# Patient Record
Sex: FEMALE | Race: WHITE
[De-identification: names, ages, dates, MRNs, and addresses within clinical notes are randomized per-mention and may not be internally consistent; named-entity substitution may affect disease eponyms.]

---

## 2017-12-18 ENCOUNTER — TRANSCRIPTION ENCOUNTER (OUTPATIENT)
Age: 62
End: 2017-12-18

## 2017-12-18 PROBLEM — Z00.00 ENCOUNTER FOR PREVENTIVE HEALTH EXAMINATION: Status: ACTIVE | Noted: 2017-12-18

## 2017-12-19 ENCOUNTER — APPOINTMENT (OUTPATIENT)
Dept: NEUROSURGERY | Facility: CLINIC | Age: 62
End: 2017-12-19
Payer: COMMERCIAL

## 2017-12-19 VITALS
BODY MASS INDEX: 33.13 KG/M2 | HEART RATE: 68 BPM | SYSTOLIC BLOOD PRESSURE: 161 MMHG | DIASTOLIC BLOOD PRESSURE: 77 MMHG | HEIGHT: 62 IN | TEMPERATURE: 98.3 F | OXYGEN SATURATION: 94 % | WEIGHT: 180 LBS

## 2017-12-19 DIAGNOSIS — Z86.19 PERSONAL HISTORY OF OTHER INFECTIOUS AND PARASITIC DISEASES: ICD-10-CM

## 2017-12-19 DIAGNOSIS — D33.2 BENIGN NEOPLASM OF BRAIN, UNSPECIFIED: ICD-10-CM

## 2017-12-19 DIAGNOSIS — Z02.82 ENCOUNTER FOR ADOPTION SERVICES: ICD-10-CM

## 2017-12-19 DIAGNOSIS — Z86.39 PERSONAL HISTORY OF OTHER ENDOCRINE, NUTRITIONAL AND METABOLIC DISEASE: ICD-10-CM

## 2017-12-19 DIAGNOSIS — Z94.7 CORNEAL TRANSPLANT STATUS: ICD-10-CM

## 2017-12-19 PROCEDURE — 99244 OFF/OP CNSLTJ NEW/EST MOD 40: CPT | Mod: 57

## 2017-12-19 RX ORDER — CITALOPRAM 20 MG/1
20 TABLET, FILM COATED ORAL
Refills: 0 | Status: ACTIVE | COMMUNITY

## 2017-12-19 RX ORDER — LEVOTHYROXINE SODIUM 0.1 MG/1
100 TABLET ORAL
Refills: 0 | Status: ACTIVE | COMMUNITY

## 2018-01-10 ENCOUNTER — TRANSCRIPTION ENCOUNTER (OUTPATIENT)
Age: 63
End: 2018-01-10

## 2018-02-08 ENCOUNTER — OUTPATIENT (OUTPATIENT)
Dept: OUTPATIENT SERVICES | Facility: HOSPITAL | Age: 63
LOS: 1 days | End: 2018-02-08
Payer: COMMERCIAL

## 2018-02-08 VITALS
DIASTOLIC BLOOD PRESSURE: 73 MMHG | WEIGHT: 184.97 LBS | HEART RATE: 67 BPM | SYSTOLIC BLOOD PRESSURE: 139 MMHG | OXYGEN SATURATION: 96 % | HEIGHT: 62 IN | RESPIRATION RATE: 20 BRPM

## 2018-02-08 DIAGNOSIS — Z01.818 ENCOUNTER FOR OTHER PREPROCEDURAL EXAMINATION: ICD-10-CM

## 2018-02-08 DIAGNOSIS — D33.2 BENIGN NEOPLASM OF BRAIN, UNSPECIFIED: ICD-10-CM

## 2018-02-08 DIAGNOSIS — D32.9 BENIGN NEOPLASM OF MENINGES, UNSPECIFIED: ICD-10-CM

## 2018-02-08 DIAGNOSIS — Z98.890 OTHER SPECIFIED POSTPROCEDURAL STATES: Chronic | ICD-10-CM

## 2018-02-08 DIAGNOSIS — G56.01 CARPAL TUNNEL SYNDROME, RIGHT UPPER LIMB: Chronic | ICD-10-CM

## 2018-02-08 DIAGNOSIS — Z94.7 CORNEAL TRANSPLANT STATUS: Chronic | ICD-10-CM

## 2018-02-08 DIAGNOSIS — Z90.89 ACQUIRED ABSENCE OF OTHER ORGANS: Chronic | ICD-10-CM

## 2018-02-08 LAB
ANION GAP SERPL CALC-SCNC: 10 MMOL/L — SIGNIFICANT CHANGE UP (ref 5–17)
BLD GP AB SCN SERPL QL: NEGATIVE — SIGNIFICANT CHANGE UP
BUN SERPL-MCNC: 11 MG/DL — SIGNIFICANT CHANGE UP (ref 7–23)
CALCIUM SERPL-MCNC: 9.3 MG/DL — SIGNIFICANT CHANGE UP (ref 8.4–10.5)
CHLORIDE SERPL-SCNC: 103 MMOL/L — SIGNIFICANT CHANGE UP (ref 96–108)
CO2 SERPL-SCNC: 27 MMOL/L — SIGNIFICANT CHANGE UP (ref 22–31)
CREAT SERPL-MCNC: 0.61 MG/DL — SIGNIFICANT CHANGE UP (ref 0.5–1.3)
GLUCOSE SERPL-MCNC: 100 MG/DL — HIGH (ref 70–99)
HCT VFR BLD CALC: 40.1 % — SIGNIFICANT CHANGE UP (ref 34.5–45)
HGB BLD-MCNC: 13.3 G/DL — SIGNIFICANT CHANGE UP (ref 11.5–15.5)
MCHC RBC-ENTMCNC: 30.6 PG — SIGNIFICANT CHANGE UP (ref 27–34)
MCHC RBC-ENTMCNC: 33.2 GM/DL — SIGNIFICANT CHANGE UP (ref 32–36)
MCV RBC AUTO: 92.2 FL — SIGNIFICANT CHANGE UP (ref 80–100)
PLATELET # BLD AUTO: 258 K/UL — SIGNIFICANT CHANGE UP (ref 150–400)
POTASSIUM SERPL-MCNC: 3.9 MMOL/L — SIGNIFICANT CHANGE UP (ref 3.5–5.3)
POTASSIUM SERPL-SCNC: 3.9 MMOL/L — SIGNIFICANT CHANGE UP (ref 3.5–5.3)
RBC # BLD: 4.35 M/UL — SIGNIFICANT CHANGE UP (ref 3.8–5.2)
RBC # FLD: 12.6 % — SIGNIFICANT CHANGE UP (ref 10.3–14.5)
RH IG SCN BLD-IMP: NEGATIVE — SIGNIFICANT CHANGE UP
SODIUM SERPL-SCNC: 140 MMOL/L — SIGNIFICANT CHANGE UP (ref 135–145)
WBC # BLD: 6.11 K/UL — SIGNIFICANT CHANGE UP (ref 3.8–10.5)
WBC # FLD AUTO: 6.11 K/UL — SIGNIFICANT CHANGE UP (ref 3.8–10.5)

## 2018-02-08 PROCEDURE — 86850 RBC ANTIBODY SCREEN: CPT

## 2018-02-08 PROCEDURE — 86900 BLOOD TYPING SEROLOGIC ABO: CPT

## 2018-02-08 PROCEDURE — 93005 ELECTROCARDIOGRAM TRACING: CPT

## 2018-02-08 PROCEDURE — 85027 COMPLETE CBC AUTOMATED: CPT

## 2018-02-08 PROCEDURE — 93010 ELECTROCARDIOGRAM REPORT: CPT

## 2018-02-08 PROCEDURE — 86901 BLOOD TYPING SEROLOGIC RH(D): CPT

## 2018-02-08 PROCEDURE — G0463: CPT

## 2018-02-08 PROCEDURE — 80048 BASIC METABOLIC PNL TOTAL CA: CPT

## 2018-02-08 RX ORDER — SODIUM CHLORIDE 9 MG/ML
3 INJECTION INTRAMUSCULAR; INTRAVENOUS; SUBCUTANEOUS EVERY 8 HOURS
Qty: 0 | Refills: 0 | Status: DISCONTINUED | OUTPATIENT
Start: 2018-02-15 | End: 2018-02-15

## 2018-02-08 RX ORDER — LIDOCAINE HCL 20 MG/ML
0.2 VIAL (ML) INJECTION ONCE
Qty: 0 | Refills: 0 | Status: DISCONTINUED | OUTPATIENT
Start: 2018-02-15 | End: 2018-02-15

## 2018-02-08 NOTE — H&P PST ADULT - HISTORY OF PRESENT ILLNESS
63 y/o  female with PMHx of Hypothyroidism, obesity, depression, now presents with headaches for many years which was getting worse lately, diagnostic  imaging reveals a meningioma, seen & evaluated by neurosurgeon & now recommends a Right Frontal Craniotomy for tumor with sterotaxy on 2/15/18.

## 2018-02-08 NOTE — H&P PST ADULT - NSANTHOSAYNRD_GEN_A_CORE
No. CUCO screening performed.  STOP BANG Legend: 0-2 = LOW Risk; 3-4 = INTERMEDIATE Risk; 5-8 = HIGH Risk

## 2018-02-08 NOTE — H&P PST ADULT - PSH
Carpal tunnel syndrome of right wrist     delivery delivered    Corneal transplant status  left  H/O melanoma excision  in right Lower ext  S/P T&A (status post tonsillectomy and adenoidectomy)

## 2018-02-08 NOTE — H&P PST ADULT - PMH
Carpal tunnel syndrome    Depression    Melanoma  Right LE  Meningioma    Migraine  HA Carpal tunnel syndrome    Depression    Hypothyroidism    Melanoma  Right LE  Meniere disease    Meningioma    Migraine  HA  Obesity

## 2018-02-08 NOTE — H&P PST ADULT - ATTENDING COMMENTS
Patient with right frontal meningioma. I spoke with her by phone and nothing has changed over the past few weeks. She wishes to proceed with surgery for tumor removal. I have discussed the risks and benefits of surgery with her at length. My office note has more details.

## 2018-02-12 ENCOUNTER — OUTPATIENT (OUTPATIENT)
Dept: OUTPATIENT SERVICES | Facility: HOSPITAL | Age: 63
LOS: 1 days | End: 2018-02-12
Payer: COMMERCIAL

## 2018-02-12 ENCOUNTER — APPOINTMENT (OUTPATIENT)
Dept: MRI IMAGING | Facility: CLINIC | Age: 63
End: 2018-02-12
Payer: COMMERCIAL

## 2018-02-12 ENCOUNTER — TRANSCRIPTION ENCOUNTER (OUTPATIENT)
Age: 63
End: 2018-02-12

## 2018-02-12 DIAGNOSIS — G56.01 CARPAL TUNNEL SYNDROME, RIGHT UPPER LIMB: Chronic | ICD-10-CM

## 2018-02-12 DIAGNOSIS — Z00.8 ENCOUNTER FOR OTHER GENERAL EXAMINATION: ICD-10-CM

## 2018-02-12 DIAGNOSIS — Z90.89 ACQUIRED ABSENCE OF OTHER ORGANS: Chronic | ICD-10-CM

## 2018-02-12 DIAGNOSIS — Z94.7 CORNEAL TRANSPLANT STATUS: Chronic | ICD-10-CM

## 2018-02-12 DIAGNOSIS — Z98.890 OTHER SPECIFIED POSTPROCEDURAL STATES: Chronic | ICD-10-CM

## 2018-02-12 PROCEDURE — 70553 MRI BRAIN STEM W/O & W/DYE: CPT | Mod: 26

## 2018-02-12 PROCEDURE — 70553 MRI BRAIN STEM W/O & W/DYE: CPT

## 2018-02-12 PROCEDURE — A9585: CPT

## 2018-02-12 PROCEDURE — 82565 ASSAY OF CREATININE: CPT

## 2018-02-15 ENCOUNTER — TRANSCRIPTION ENCOUNTER (OUTPATIENT)
Age: 63
End: 2018-02-15

## 2018-02-15 ENCOUNTER — INPATIENT (INPATIENT)
Facility: HOSPITAL | Age: 63
LOS: 3 days | Discharge: HOME | DRG: 27 | End: 2018-02-19
Attending: NEUROLOGICAL SURGERY | Admitting: NEUROLOGICAL SURGERY
Payer: COMMERCIAL

## 2018-02-15 ENCOUNTER — APPOINTMENT (OUTPATIENT)
Dept: NEUROSURGERY | Facility: CLINIC | Age: 63
End: 2018-02-15

## 2018-02-15 ENCOUNTER — RESULT REVIEW (OUTPATIENT)
Age: 63
End: 2018-02-15

## 2018-02-15 VITALS
RESPIRATION RATE: 18 BRPM | WEIGHT: 184.97 LBS | DIASTOLIC BLOOD PRESSURE: 79 MMHG | TEMPERATURE: 98 F | OXYGEN SATURATION: 98 % | HEART RATE: 79 BPM | HEIGHT: 62 IN | SYSTOLIC BLOOD PRESSURE: 149 MMHG

## 2018-02-15 DIAGNOSIS — D33.2 BENIGN NEOPLASM OF BRAIN, UNSPECIFIED: ICD-10-CM

## 2018-02-15 DIAGNOSIS — G56.01 CARPAL TUNNEL SYNDROME, RIGHT UPPER LIMB: Chronic | ICD-10-CM

## 2018-02-15 DIAGNOSIS — Z90.89 ACQUIRED ABSENCE OF OTHER ORGANS: Chronic | ICD-10-CM

## 2018-02-15 DIAGNOSIS — D32.9 BENIGN NEOPLASM OF MENINGES, UNSPECIFIED: ICD-10-CM

## 2018-02-15 DIAGNOSIS — Z98.890 OTHER SPECIFIED POSTPROCEDURAL STATES: Chronic | ICD-10-CM

## 2018-02-15 DIAGNOSIS — Z94.7 CORNEAL TRANSPLANT STATUS: Chronic | ICD-10-CM

## 2018-02-15 PROCEDURE — 88307 TISSUE EXAM BY PATHOLOGIST: CPT | Mod: 26

## 2018-02-15 PROCEDURE — 61781 SCAN PROC CRANIAL INTRA: CPT

## 2018-02-15 PROCEDURE — 99233 SBSQ HOSP IP/OBS HIGH 50: CPT

## 2018-02-15 PROCEDURE — 88342 IMHCHEM/IMCYTCHM 1ST ANTB: CPT | Mod: 26,59

## 2018-02-15 PROCEDURE — 61512 CRNEC TREPH EXC MNGIOMA STTL: CPT

## 2018-02-15 PROCEDURE — 88360 TUMOR IMMUNOHISTOCHEM/MANUAL: CPT | Mod: 26

## 2018-02-15 RX ORDER — OXYCODONE AND ACETAMINOPHEN 5; 325 MG/1; MG/1
1 TABLET ORAL EVERY 4 HOURS
Qty: 0 | Refills: 0 | Status: DISCONTINUED | OUTPATIENT
Start: 2018-02-15 | End: 2018-02-16

## 2018-02-15 RX ORDER — HYDROMORPHONE HYDROCHLORIDE 2 MG/ML
0.5 INJECTION INTRAMUSCULAR; INTRAVENOUS; SUBCUTANEOUS EVERY 6 HOURS
Qty: 0 | Refills: 0 | Status: DISCONTINUED | OUTPATIENT
Start: 2018-02-15 | End: 2018-02-17

## 2018-02-15 RX ORDER — VANCOMYCIN HCL 1 G
1000 VIAL (EA) INTRAVENOUS ONCE
Qty: 0 | Refills: 0 | Status: COMPLETED | OUTPATIENT
Start: 2018-02-15 | End: 2018-02-15

## 2018-02-15 RX ORDER — LEVETIRACETAM 250 MG/1
500 TABLET, FILM COATED ORAL EVERY 12 HOURS
Qty: 0 | Refills: 0 | Status: DISCONTINUED | OUTPATIENT
Start: 2018-02-15 | End: 2018-02-19

## 2018-02-15 RX ORDER — ACETAMINOPHEN 500 MG
650 TABLET ORAL EVERY 6 HOURS
Qty: 0 | Refills: 0 | Status: DISCONTINUED | OUTPATIENT
Start: 2018-02-15 | End: 2018-02-17

## 2018-02-15 RX ORDER — DOCUSATE SODIUM 100 MG
100 CAPSULE ORAL
Qty: 0 | Refills: 0 | Status: DISCONTINUED | OUTPATIENT
Start: 2018-02-15 | End: 2018-02-19

## 2018-02-15 RX ORDER — DEXTROSE MONOHYDRATE, SODIUM CHLORIDE, AND POTASSIUM CHLORIDE 50; .745; 4.5 G/1000ML; G/1000ML; G/1000ML
1000 INJECTION, SOLUTION INTRAVENOUS
Qty: 0 | Refills: 0 | Status: DISCONTINUED | OUTPATIENT
Start: 2018-02-15 | End: 2018-02-16

## 2018-02-15 RX ORDER — ONDANSETRON 8 MG/1
4 TABLET, FILM COATED ORAL ONCE
Qty: 0 | Refills: 0 | Status: COMPLETED | OUTPATIENT
Start: 2018-02-15 | End: 2018-02-15

## 2018-02-15 RX ORDER — SENNA PLUS 8.6 MG/1
1 TABLET ORAL DAILY
Qty: 0 | Refills: 0 | Status: DISCONTINUED | OUTPATIENT
Start: 2018-02-15 | End: 2018-02-19

## 2018-02-15 RX ORDER — HYDROMORPHONE HYDROCHLORIDE 2 MG/ML
0.5 INJECTION INTRAMUSCULAR; INTRAVENOUS; SUBCUTANEOUS
Qty: 0 | Refills: 0 | Status: DISCONTINUED | OUTPATIENT
Start: 2018-02-15 | End: 2018-02-15

## 2018-02-15 RX ORDER — ONDANSETRON 8 MG/1
8 TABLET, FILM COATED ORAL ONCE
Qty: 0 | Refills: 0 | Status: COMPLETED | OUTPATIENT
Start: 2018-02-15 | End: 2018-02-15

## 2018-02-15 RX ORDER — LEVOTHYROXINE SODIUM 125 MCG
100 TABLET ORAL DAILY
Qty: 0 | Refills: 0 | Status: DISCONTINUED | OUTPATIENT
Start: 2018-02-15 | End: 2018-02-19

## 2018-02-15 RX ORDER — ACETAMINOPHEN 500 MG
1000 TABLET ORAL ONCE
Qty: 0 | Refills: 0 | Status: COMPLETED | OUTPATIENT
Start: 2018-02-15 | End: 2018-02-15

## 2018-02-15 RX ORDER — OXYCODONE AND ACETAMINOPHEN 5; 325 MG/1; MG/1
2 TABLET ORAL EVERY 4 HOURS
Qty: 0 | Refills: 0 | Status: DISCONTINUED | OUTPATIENT
Start: 2018-02-15 | End: 2018-02-16

## 2018-02-15 RX ORDER — CITALOPRAM 10 MG/1
20 TABLET, FILM COATED ORAL DAILY
Qty: 0 | Refills: 0 | Status: DISCONTINUED | OUTPATIENT
Start: 2018-02-15 | End: 2018-02-19

## 2018-02-15 RX ADMIN — DEXTROSE MONOHYDRATE, SODIUM CHLORIDE, AND POTASSIUM CHLORIDE 75 MILLILITER(S): 50; .745; 4.5 INJECTION, SOLUTION INTRAVENOUS at 23:20

## 2018-02-15 RX ADMIN — Medication 1000 MILLIGRAM(S): at 22:30

## 2018-02-15 RX ADMIN — HYDROMORPHONE HYDROCHLORIDE 0.5 MILLIGRAM(S): 2 INJECTION INTRAMUSCULAR; INTRAVENOUS; SUBCUTANEOUS at 18:13

## 2018-02-15 RX ADMIN — HYDROMORPHONE HYDROCHLORIDE 0.5 MILLIGRAM(S): 2 INJECTION INTRAMUSCULAR; INTRAVENOUS; SUBCUTANEOUS at 18:34

## 2018-02-15 RX ADMIN — DEXTROSE MONOHYDRATE, SODIUM CHLORIDE, AND POTASSIUM CHLORIDE 75 MILLILITER(S): 50; .745; 4.5 INJECTION, SOLUTION INTRAVENOUS at 14:55

## 2018-02-15 RX ADMIN — HYDROMORPHONE HYDROCHLORIDE 0.5 MILLIGRAM(S): 2 INJECTION INTRAMUSCULAR; INTRAVENOUS; SUBCUTANEOUS at 13:54

## 2018-02-15 RX ADMIN — HYDROMORPHONE HYDROCHLORIDE 0.5 MILLIGRAM(S): 2 INJECTION INTRAMUSCULAR; INTRAVENOUS; SUBCUTANEOUS at 12:30

## 2018-02-15 RX ADMIN — LEVETIRACETAM 500 MILLIGRAM(S): 250 TABLET, FILM COATED ORAL at 19:56

## 2018-02-15 RX ADMIN — ONDANSETRON 4 MILLIGRAM(S): 8 TABLET, FILM COATED ORAL at 17:25

## 2018-02-15 RX ADMIN — HYDROMORPHONE HYDROCHLORIDE 0.5 MILLIGRAM(S): 2 INJECTION INTRAMUSCULAR; INTRAVENOUS; SUBCUTANEOUS at 14:08

## 2018-02-15 RX ADMIN — HYDROMORPHONE HYDROCHLORIDE 0.5 MILLIGRAM(S): 2 INJECTION INTRAMUSCULAR; INTRAVENOUS; SUBCUTANEOUS at 15:00

## 2018-02-15 RX ADMIN — Medication 400 MILLIGRAM(S): at 22:12

## 2018-02-15 RX ADMIN — HYDROMORPHONE HYDROCHLORIDE 0.5 MILLIGRAM(S): 2 INJECTION INTRAMUSCULAR; INTRAVENOUS; SUBCUTANEOUS at 12:16

## 2018-02-15 RX ADMIN — HYDROMORPHONE HYDROCHLORIDE 0.5 MILLIGRAM(S): 2 INJECTION INTRAMUSCULAR; INTRAVENOUS; SUBCUTANEOUS at 23:35

## 2018-02-15 RX ADMIN — HYDROMORPHONE HYDROCHLORIDE 0.5 MILLIGRAM(S): 2 INJECTION INTRAMUSCULAR; INTRAVENOUS; SUBCUTANEOUS at 23:50

## 2018-02-15 RX ADMIN — HYDROMORPHONE HYDROCHLORIDE 0.5 MILLIGRAM(S): 2 INJECTION INTRAMUSCULAR; INTRAVENOUS; SUBCUTANEOUS at 13:33

## 2018-02-15 RX ADMIN — ONDANSETRON 8 MILLIGRAM(S): 8 TABLET, FILM COATED ORAL at 18:35

## 2018-02-15 NOTE — PROGRESS NOTE ADULT - ASSESSMENT
ASSESSMENT/PLAN:  now s/p Right Frontal Craniotomy for tumor with on 2/15/18,  POD 0      NEURO: neuro checks q1hrs, Keppra for seizure ppx, Pain control, CT head in the AM   Activity: [] mobilize as tolerated [] Bedrest [] PT [] OT [] PMNR    PULM:  O2 sat > 92%      CV:  SBP goal  MAP > 65    RENAL:  Fluids:  NS @ 75    GI:  Diet: advance diet as tolerated   GI prophylaxis [x] not indicated [] PPI [] other:  Bowel regimen [] colace [] senna [] other:    ENDO:   Goal euglycemia (-180)  Hypothyroid c/w Levothyroxine    HEME/ONC:  VTE prophylaxis: [] SCDs [] chemoprophylaxis [x] hold chemoprophylaxis due to:  fresh post op patient  [x] high risk of DVT/PE on admission due to:  Meingioma    ID:  afebrile    MISC:    SOCIAL/FAMILY:  [x] awaiting [] updated at bedside [] family meeting    CODE STATUS:  [x] Full Code [] DNR [] DNI [] Palliative/Comfort Care    DISPOSITION:  [x] ICU [] Stroke Unit [] Floor [] EMU [] RCU [] PCU    [x] Patient is at high risk of neurologic deterioration/death due to:   resection of meningioma with risk of intracranial hemorrhage       Time seen:  Time spent: ___ [] critical care minutes    Contact: [phone] ASSESSMENT/PLAN:  now s/p Right Frontal Craniotomy for tumor with on 2/15/18,  POD 0      NEURO: neuro checks q1hrs, Keppra for seizure ppx, Pain control, CT head in the AM   Activity: [x] mobilize as tolerated [] Bedrest [] PT [] OT [] PMNR    PULM:  O2 sat > 92%      CV:  SBP goal  MAP > 65    RENAL:  Fluids:  NS @ 75    GI:  Diet: advance diet as tolerated   GI prophylaxis [x] not indicated [] PPI [] other:  Bowel regimen [] colace [] senna [] other:    ENDO:   Goal euglycemia (-180)  Hypothyroid c/w Levothyroxine    HEME/ONC:  VTE prophylaxis: [] SCDs [] chemoprophylaxis [x] hold chemoprophylaxis due to:  fresh post op patient  [x] high risk of DVT/PE on admission due to:  Meingioma    ID:  afebrile    MISC:    SOCIAL/FAMILY:  [x] awaiting [] updated at bedside [] family meeting    CODE STATUS:  [x] Full Code [] DNR [] DNI [] Palliative/Comfort Care    DISPOSITION:  [x] ICU [] Stroke Unit [] Floor [] EMU [] RCU [] PCU    [x] Patient is at high risk of neurologic deterioration/death due to:   resection of meningioma with risk of intracranial hemorrhage       high complexity     Contact: [phone]

## 2018-02-15 NOTE — PROGRESS NOTE ADULT - SUBJECTIVE AND OBJECTIVE BOX
61 y/o  female with PMHx of Hypothyroidism, obesity, depression, now presents with headaches for many years which was getting worse lately, diagnostic  imaging reveals a meningioma, seen & evaluated by neurosurgeon   now s/p Right Frontal Craniotomy for tumor with on 2/15/18    Patient seen and examined at bedside       VITALS:  T(C): , Max: 36.6 (02-15-18 @ 05:46)  HR:  (54 - 79)  BP:  (121/58 - 177/79)  ABP:  (129/56 - 162/72)  RR:  (16 - 18)  SpO2:  (94% - 98%)  Wt(kg): --      02-15-18 @ 07:01  -  02-15-18 @ 17:59  --------------------------------------------------------  IN: 450 mL / OUT: 465 mL / NET: -15 mL      LABS:  Pending   IMAGING:   Recent imaging studies were reviewed.    MEDICATIONS:  acetaminophen   Tablet 650 milliGRAM(s) Oral every 6 hours PRN  acetaminophen   Tablet. 650 milliGRAM(s) Oral every 6 hours PRN  acetaminophen  IVPB. 1000 milliGRAM(s) IV Intermittent once PRN  citalopram 20 milliGRAM(s) Oral daily  HYDROmorphone  Injectable 0.5 milliGRAM(s) IV Push every 10 minutes PRN  HYDROmorphone  Injectable 0.5 milliGRAM(s) IV Push every 6 hours PRN  levETIRAcetam 500 milliGRAM(s) Oral every 12 hours  levothyroxine 100 MICROGram(s) Oral daily  oxyCODONE    5 mG/acetaminophen 325 mG 1 Tablet(s) Oral every 4 hours PRN  oxyCODONE    5 mG/acetaminophen 325 mG 2 Tablet(s) Oral every 4 hours PRN  sodium chloride 0.9% with potassium chloride 20 mEq/L 1000 milliLiter(s) IV Continuous <Continuous>      EXAMINATION:  General:  calm  HEENT:  MMM, dressing clean dry intact, Hemovac   Neuro:  awake, alert, oriented x 3, follows commands, moves all extremities  Cards:  RRR  Respiratory:  no respiratory distress  Adomen:  soft  : Traore   Extremities:  no edema  Skin:  warm/dry 63 y/o  female with PMHx of Hypothyroidism, obesity, depression, now presents with headaches for many years which was getting worse lately, diagnostic  imaging reveals a meningioma, seen & evaluated by neurosurgeon   now s/p Right Frontal Craniotomy for tumor with on 2/15/18    Patient seen and examined at bedside     Events: craniotomy for meningeoma resection       VITALS:  T(C): , Max: 36.6 (02-15-18 @ 05:46)  HR:  (54 - 79)  BP:  (121/58 - 177/79)  ABP:  (129/56 - 162/72)  RR:  (16 - 18)  SpO2:  (94% - 98%)  Wt(kg): --      02-15-18 @ 07:01  -  02-15-18 @ 17:59  --------------------------------------------------------  IN: 450 mL / OUT: 465 mL / NET: -15 mL      LABS:  Pending   IMAGING:   Recent imaging studies were reviewed.    MEDICATIONS:  acetaminophen   Tablet 650 milliGRAM(s) Oral every 6 hours PRN  acetaminophen   Tablet. 650 milliGRAM(s) Oral every 6 hours PRN  acetaminophen  IVPB. 1000 milliGRAM(s) IV Intermittent once PRN  citalopram 20 milliGRAM(s) Oral daily  HYDROmorphone  Injectable 0.5 milliGRAM(s) IV Push every 10 minutes PRN  HYDROmorphone  Injectable 0.5 milliGRAM(s) IV Push every 6 hours PRN  levETIRAcetam 500 milliGRAM(s) Oral every 12 hours  levothyroxine 100 MICROGram(s) Oral daily  oxyCODONE    5 mG/acetaminophen 325 mG 1 Tablet(s) Oral every 4 hours PRN  oxyCODONE    5 mG/acetaminophen 325 mG 2 Tablet(s) Oral every 4 hours PRN  sodium chloride 0.9% with potassium chloride 20 mEq/L 1000 milliLiter(s) IV Continuous <Continuous>      EXAMINATION:  General:  calm  HEENT:  MMM, dressing clean dry intact, Hemovac   Neuro:  awake, alert, oriented x 3, follows commands, moves all extremities  Cards:  RRR  Respiratory:  no respiratory distress  Adomen:  soft  : Traore   Extremities:  no edema  Skin:  warm/dry    `

## 2018-02-15 NOTE — BRIEF OPERATIVE NOTE - PROCEDURE
<<-----Click on this checkbox to enter Procedure Brain tumor excision  02/15/2018    Active  [id number]

## 2018-02-16 LAB
ANION GAP SERPL CALC-SCNC: 10 MMOL/L — SIGNIFICANT CHANGE UP (ref 5–17)
BUN SERPL-MCNC: 10 MG/DL — SIGNIFICANT CHANGE UP (ref 7–23)
CALCIUM SERPL-MCNC: 8.7 MG/DL — SIGNIFICANT CHANGE UP (ref 8.4–10.5)
CHLORIDE SERPL-SCNC: 106 MMOL/L — SIGNIFICANT CHANGE UP (ref 96–108)
CO2 SERPL-SCNC: 25 MMOL/L — SIGNIFICANT CHANGE UP (ref 22–31)
CREAT SERPL-MCNC: 0.56 MG/DL — SIGNIFICANT CHANGE UP (ref 0.5–1.3)
GLUCOSE SERPL-MCNC: 166 MG/DL — HIGH (ref 70–99)
HCT VFR BLD CALC: 36.2 % — SIGNIFICANT CHANGE UP (ref 34.5–45)
HGB BLD-MCNC: 12.5 G/DL — SIGNIFICANT CHANGE UP (ref 11.5–15.5)
MAGNESIUM SERPL-MCNC: 2 MG/DL — SIGNIFICANT CHANGE UP (ref 1.6–2.6)
MCHC RBC-ENTMCNC: 32.5 PG — SIGNIFICANT CHANGE UP (ref 27–34)
MCHC RBC-ENTMCNC: 34.4 GM/DL — SIGNIFICANT CHANGE UP (ref 32–36)
MCV RBC AUTO: 94.4 FL — SIGNIFICANT CHANGE UP (ref 80–100)
PHOSPHATE SERPL-MCNC: 3.2 MG/DL — SIGNIFICANT CHANGE UP (ref 2.5–4.5)
PLATELET # BLD AUTO: 252 K/UL — SIGNIFICANT CHANGE UP (ref 150–400)
POTASSIUM SERPL-MCNC: 4.1 MMOL/L — SIGNIFICANT CHANGE UP (ref 3.5–5.3)
POTASSIUM SERPL-SCNC: 4.1 MMOL/L — SIGNIFICANT CHANGE UP (ref 3.5–5.3)
RBC # BLD: 3.84 M/UL — SIGNIFICANT CHANGE UP (ref 3.8–5.2)
RBC # FLD: 11.4 % — SIGNIFICANT CHANGE UP (ref 10.3–14.5)
SODIUM SERPL-SCNC: 141 MMOL/L — SIGNIFICANT CHANGE UP (ref 135–145)
WBC # BLD: 9.4 K/UL — SIGNIFICANT CHANGE UP (ref 3.8–10.5)
WBC # FLD AUTO: 9.4 K/UL — SIGNIFICANT CHANGE UP (ref 3.8–10.5)

## 2018-02-16 PROCEDURE — 70450 CT HEAD/BRAIN W/O DYE: CPT | Mod: 26

## 2018-02-16 PROCEDURE — 99232 SBSQ HOSP IP/OBS MODERATE 35: CPT

## 2018-02-16 RX ORDER — OXYCODONE HYDROCHLORIDE 5 MG/1
5 TABLET ORAL EVERY 6 HOURS
Qty: 0 | Refills: 0 | Status: DISCONTINUED | OUTPATIENT
Start: 2018-02-16 | End: 2018-02-17

## 2018-02-16 RX ORDER — ONDANSETRON 8 MG/1
4 TABLET, FILM COATED ORAL EVERY 6 HOURS
Qty: 0 | Refills: 0 | Status: DISCONTINUED | OUTPATIENT
Start: 2018-02-16 | End: 2018-02-19

## 2018-02-16 RX ORDER — ONDANSETRON 8 MG/1
4 TABLET, FILM COATED ORAL ONCE
Qty: 0 | Refills: 0 | Status: COMPLETED | OUTPATIENT
Start: 2018-02-16 | End: 2018-02-16

## 2018-02-16 RX ORDER — ENOXAPARIN SODIUM 100 MG/ML
40 INJECTION SUBCUTANEOUS AT BEDTIME
Qty: 0 | Refills: 0 | Status: DISCONTINUED | OUTPATIENT
Start: 2018-02-16 | End: 2018-02-19

## 2018-02-16 RX ORDER — HYDROMORPHONE HYDROCHLORIDE 2 MG/ML
0.5 INJECTION INTRAMUSCULAR; INTRAVENOUS; SUBCUTANEOUS ONCE
Qty: 0 | Refills: 0 | Status: DISCONTINUED | OUTPATIENT
Start: 2018-02-16 | End: 2018-02-16

## 2018-02-16 RX ORDER — ACETAMINOPHEN 500 MG
1000 TABLET ORAL ONCE
Qty: 0 | Refills: 0 | Status: COMPLETED | OUTPATIENT
Start: 2018-02-16 | End: 2018-02-16

## 2018-02-16 RX ORDER — OXYCODONE HYDROCHLORIDE 5 MG/1
10 TABLET ORAL EVERY 6 HOURS
Qty: 0 | Refills: 0 | Status: DISCONTINUED | OUTPATIENT
Start: 2018-02-16 | End: 2018-02-17

## 2018-02-16 RX ORDER — ONDANSETRON 8 MG/1
8 TABLET, FILM COATED ORAL ONCE
Qty: 0 | Refills: 0 | Status: COMPLETED | OUTPATIENT
Start: 2018-02-16 | End: 2018-02-16

## 2018-02-16 RX ADMIN — Medication 650 MILLIGRAM(S): at 12:38

## 2018-02-16 RX ADMIN — ONDANSETRON 4 MILLIGRAM(S): 8 TABLET, FILM COATED ORAL at 06:41

## 2018-02-16 RX ADMIN — ONDANSETRON 8 MILLIGRAM(S): 8 TABLET, FILM COATED ORAL at 13:40

## 2018-02-16 RX ADMIN — ENOXAPARIN SODIUM 40 MILLIGRAM(S): 100 INJECTION SUBCUTANEOUS at 21:40

## 2018-02-16 RX ADMIN — Medication 1000 MILLIGRAM(S): at 04:30

## 2018-02-16 RX ADMIN — Medication 100 MICROGRAM(S): at 07:01

## 2018-02-16 RX ADMIN — OXYCODONE HYDROCHLORIDE 10 MILLIGRAM(S): 5 TABLET ORAL at 18:07

## 2018-02-16 RX ADMIN — Medication 100 MILLIGRAM(S): at 18:07

## 2018-02-16 RX ADMIN — HYDROMORPHONE HYDROCHLORIDE 0.5 MILLIGRAM(S): 2 INJECTION INTRAMUSCULAR; INTRAVENOUS; SUBCUTANEOUS at 06:40

## 2018-02-16 RX ADMIN — SENNA PLUS 1 TABLET(S): 8.6 TABLET ORAL at 12:10

## 2018-02-16 RX ADMIN — HYDROMORPHONE HYDROCHLORIDE 0.5 MILLIGRAM(S): 2 INJECTION INTRAMUSCULAR; INTRAVENOUS; SUBCUTANEOUS at 12:08

## 2018-02-16 RX ADMIN — Medication 650 MILLIGRAM(S): at 13:40

## 2018-02-16 RX ADMIN — Medication 650 MILLIGRAM(S): at 22:00

## 2018-02-16 RX ADMIN — HYDROMORPHONE HYDROCHLORIDE 0.5 MILLIGRAM(S): 2 INJECTION INTRAMUSCULAR; INTRAVENOUS; SUBCUTANEOUS at 19:40

## 2018-02-16 RX ADMIN — LEVETIRACETAM 500 MILLIGRAM(S): 250 TABLET, FILM COATED ORAL at 07:01

## 2018-02-16 RX ADMIN — HYDROMORPHONE HYDROCHLORIDE 0.5 MILLIGRAM(S): 2 INJECTION INTRAMUSCULAR; INTRAVENOUS; SUBCUTANEOUS at 16:22

## 2018-02-16 RX ADMIN — LEVETIRACETAM 500 MILLIGRAM(S): 250 TABLET, FILM COATED ORAL at 18:07

## 2018-02-16 RX ADMIN — HYDROMORPHONE HYDROCHLORIDE 0.5 MILLIGRAM(S): 2 INJECTION INTRAMUSCULAR; INTRAVENOUS; SUBCUTANEOUS at 06:55

## 2018-02-16 RX ADMIN — Medication 400 MILLIGRAM(S): at 04:09

## 2018-02-16 RX ADMIN — HYDROMORPHONE HYDROCHLORIDE 0.5 MILLIGRAM(S): 2 INJECTION INTRAMUSCULAR; INTRAVENOUS; SUBCUTANEOUS at 16:46

## 2018-02-16 RX ADMIN — HYDROMORPHONE HYDROCHLORIDE 0.5 MILLIGRAM(S): 2 INJECTION INTRAMUSCULAR; INTRAVENOUS; SUBCUTANEOUS at 12:20

## 2018-02-16 RX ADMIN — CITALOPRAM 20 MILLIGRAM(S): 10 TABLET, FILM COATED ORAL at 12:09

## 2018-02-16 RX ADMIN — ONDANSETRON 4 MILLIGRAM(S): 8 TABLET, FILM COATED ORAL at 04:08

## 2018-02-16 RX ADMIN — DEXTROSE MONOHYDRATE, SODIUM CHLORIDE, AND POTASSIUM CHLORIDE 75 MILLILITER(S): 50; .745; 4.5 INJECTION, SOLUTION INTRAVENOUS at 08:00

## 2018-02-16 RX ADMIN — OXYCODONE HYDROCHLORIDE 10 MILLIGRAM(S): 5 TABLET ORAL at 02:23

## 2018-02-16 RX ADMIN — OXYCODONE HYDROCHLORIDE 10 MILLIGRAM(S): 5 TABLET ORAL at 03:00

## 2018-02-16 RX ADMIN — ONDANSETRON 4 MILLIGRAM(S): 8 TABLET, FILM COATED ORAL at 18:07

## 2018-02-16 RX ADMIN — DEXTROSE MONOHYDRATE, SODIUM CHLORIDE, AND POTASSIUM CHLORIDE 75 MILLILITER(S): 50; .745; 4.5 INJECTION, SOLUTION INTRAVENOUS at 02:24

## 2018-02-16 NOTE — PROGRESS NOTE ADULT - SUBJECTIVE AND OBJECTIVE BOX
63 y/o  female with PMHx of Hypothyroidism, obesity, depression, now presents with headaches for many years which was getting worse lately, diagnostic  imaging reveals a meningioma, seen & evaluated by neurosurgeon   now s/p Right Frontal Craniotomy for tumor with on 2/15/18    Patient seen and examined at bedside     Events: craniotomy for meningeoma resection   2/16: No acute events overnight      VITALS:  T(C): , Max: 37.1 (02-16-18 @ 12:00)  HR:  (54 - 104)  BP:  (98/52 - 154/74)  ABP:  (106/52 - 155/75)  RR:  (14 - 19)  SpO2:  (94% - 99%)  Wt(kg): --      02-15-18 @ 07:01  -  02-16-18 @ 07:00  --------------------------------------------------------  IN: 1645 mL / OUT: 2305 mL / NET: -660 mL    02-16-18 @ 07:01  -  02-16-18 @ 12:56  --------------------------------------------------------  IN: 575 mL / OUT: 75 mL / NET: 500 mL      LABS:  Na: 141 (02-15 @ 23:34)  K: 4.1 (02-15 @ 23:34)  Cl: 106 (02-15 @ 23:34)  CO2: 25 (02-15 @ 23:34)  BUN: 10 (02-15 @ 23:34)  Cr: 0.56 (02-15 @ 23:34)  Glu: 166(02-15 @ 23:34)    Hgb: 12.5 (02-15 @ 23:34)  Hct: 36.2 (02-15 @ 23:34)  WBC: 9.4 (02-15 @ 23:34)  Plt: 252 (02-15 @ 23:34)    INR:   PTT:     IMAGING:   Recent imaging studies were reviewed.    MEDICATIONS:  acetaminophen   Tablet 650 milliGRAM(s) Oral every 6 hours PRN  acetaminophen   Tablet. 650 milliGRAM(s) Oral every 6 hours PRN  citalopram 20 milliGRAM(s) Oral daily  docusate sodium 100 milliGRAM(s) Oral two times a day  HYDROmorphone  Injectable 0.5 milliGRAM(s) IV Push every 6 hours PRN  levETIRAcetam 500 milliGRAM(s) Oral every 12 hours  levothyroxine 100 MICROGram(s) Oral daily  oxyCODONE    IR 5 milliGRAM(s) Oral every 6 hours PRN  oxyCODONE    IR 10 milliGRAM(s) Oral every 6 hours PRN  senna 1 Tablet(s) Oral daily  sodium chloride 0.9% with potassium chloride 20 mEq/L 1000 milliLiter(s) IV Continuous <Continuous>      EXAMINATION:  General:  calm  HEENT:  MMM, dressing clean dry intact, Hemovac   Neuro:  awake, alert, oriented x 3, follows commands, moves all extremities  Cards:  RRR  Respiratory:  no respiratory distress  Adomen:  soft  : Traore   Extremities:  no edema  Skin:  warm/dry    ` 61 y/o  female with PMHx of Hypothyroidism, obesity, depression, now presents with headaches for many years which was getting worse lately, diagnostic  imaging reveals a meningioma, seen & evaluated by neurosurgeon   now s/p Right Frontal Craniotomy for tumor with on 2/15/18    Patient seen and examined at bedside     Events: craniotomy for meningeoma resection   2/16: No acute events overnight, vomited in the morning       VITALS:  T(C): , Max: 37.1 (02-16-18 @ 12:00)  HR:  (54 - 104)  BP:  (98/52 - 154/74)  ABP:  (106/52 - 155/75)  RR:  (14 - 19)  SpO2:  (94% - 99%)  Wt(kg): --      02-15-18 @ 07:01  -  02-16-18 @ 07:00  --------------------------------------------------------  IN: 1645 mL / OUT: 2305 mL / NET: -660 mL    02-16-18 @ 07:01  -  02-16-18 @ 12:56  --------------------------------------------------------  IN: 575 mL / OUT: 75 mL / NET: 500 mL      LABS:  Na: 141 (02-15 @ 23:34)  K: 4.1 (02-15 @ 23:34)  Cl: 106 (02-15 @ 23:34)  CO2: 25 (02-15 @ 23:34)  BUN: 10 (02-15 @ 23:34)  Cr: 0.56 (02-15 @ 23:34)  Glu: 166(02-15 @ 23:34)    Hgb: 12.5 (02-15 @ 23:34)  Hct: 36.2 (02-15 @ 23:34)  WBC: 9.4 (02-15 @ 23:34)  Plt: 252 (02-15 @ 23:34)    INR:   PTT:     IMAGING:   Recent imaging studies were reviewed.    MEDICATIONS:  acetaminophen   Tablet 650 milliGRAM(s) Oral every 6 hours PRN  acetaminophen   Tablet. 650 milliGRAM(s) Oral every 6 hours PRN  citalopram 20 milliGRAM(s) Oral daily  docusate sodium 100 milliGRAM(s) Oral two times a day  HYDROmorphone  Injectable 0.5 milliGRAM(s) IV Push every 6 hours PRN  levETIRAcetam 500 milliGRAM(s) Oral every 12 hours  levothyroxine 100 MICROGram(s) Oral daily  oxyCODONE    IR 5 milliGRAM(s) Oral every 6 hours PRN  oxyCODONE    IR 10 milliGRAM(s) Oral every 6 hours PRN  senna 1 Tablet(s) Oral daily  sodium chloride 0.9% with potassium chloride 20 mEq/L 1000 milliLiter(s) IV Continuous <Continuous>      EXAMINATION:  General:  calm  HEENT:  MMM, dressing clean dry intact, Hemovac   Neuro:  awake, alert, oriented x 3, follows commands, moves all extremities  Cards:  RRR  Respiratory:  no respiratory distress  Adomen:  soft  : Traore   Extremities:  no edema  Skin:  warm/dry    `

## 2018-02-16 NOTE — PROGRESS NOTE ADULT - SUBJECTIVE AND OBJECTIVE BOX
NEUROCRITICAL CARE ATTENDING EVENING NOTE    BRIEF SUMMARY:  62yFemale presented with Patient is a 62y old  Female who presents with a chief complaint of I am having a meningioma removed (15 Feb 2018 05:43)      DAY EVENTS:    VITALS/IMAGING/DATA/IVF FLUIDS/MEDICATIONS: [] Reviewed  T(C): 36.6 (02-16-18 @ 04:00), Max: 36.6 (02-15-18 @ 18:30)  HR: 56 (02-16-18 @ 05:00) (54 - 104)  BP: 124/59 (02-16-18 @ 00:00) (121/58 - 177/79)  RR: 14 (02-16-18 @ 05:00) (14 - 19)  SpO2: 98% (02-16-18 @ 05:00) (94% - 99%)    Vent Data:       ALLERGIES: Allergies    penicillin (Rash)  sulfa drugs (Rash)    Intolerances       Is and Os:    02-15-18 @ 07:01  -  02-16-18 @ 06:05  --------------------------------------------------------  IN: 1435 mL / OUT: 2030 mL / NET: -595 mL        Medications:  acetaminophen   Tablet 650 milliGRAM(s) Oral every 6 hours PRN  acetaminophen   Tablet. 650 milliGRAM(s) Oral every 6 hours PRN  citalopram 20 milliGRAM(s) Oral daily  docusate sodium 100 milliGRAM(s) Oral two times a day  HYDROmorphone  Injectable 0.5 milliGRAM(s) IV Push every 6 hours PRN  levETIRAcetam 500 milliGRAM(s) Oral every 12 hours  levothyroxine 100 MICROGram(s) Oral daily  oxyCODONE    IR 5 milliGRAM(s) Oral every 6 hours PRN  oxyCODONE    IR 10 milliGRAM(s) Oral every 6 hours PRN  senna 1 Tablet(s) Oral daily  sodium chloride 0.9% with potassium chloride 20 mEq/L 1000 milliLiter(s) IV Continuous <Continuous>    LABS:    02-15    141  |  106  |  10  ----------------------------<  166<H>  4.1   |  25  |  0.56    Ca    8.7      15 Feb 2018 23:34  Phos  3.2     02-15  Mg     2.0     02-15                            12.5   9.4   )-----------( 252      ( 15 Feb 2018 23:34 )             36.2         EXAMINATION:  General:  calm  HEENT:  MMM, dressing clean dry intact, Hemovac   Neuro:  awake, alert, oriented x 3, follows commands, moves all extremities  Cards:  RRR  Respiratory:  no respiratory distress  Adomen:  soft  : Traore   Extremities:  no edema  Skin:  warm/dry    ASSESSMENT/PLAN:  now s/p Right Frontal Craniotomy for tumor with on 2/15/18,  POD 0      NEURO: neuro checks q1hrs, Keppra for seizure ppx, Pain control, CT head in the AM   Activity: [x] mobilize as tolerated [] Bedrest [] PT [] OT [] PMNR

## 2018-02-16 NOTE — PROGRESS NOTE ADULT - ASSESSMENT
ASSESSMENT/PLAN:  now s/p Right Frontal Craniotomy for tumor with on 2/15/18,  POD 0      NEURO: neuro checks q4hrs, Keppra for seizure ppx, Pain control, CT head this AM looks stable    Activity: [x] mobilize as tolerated [] Bedrest [] PT [] OT [] PMNR    PULM:  O2 sat > 92%      CV:  SBP goal  MAP > 65    RENAL:  Fluids:  IVL     GI:  Diet: advance diet as tolerated   GI prophylaxis [x] not indicated [] PPI [] other:  Bowel regimen [] colace [] senna [] other:    ENDO:   Goal euglycemia (-180)  Hypothyroid c/w Levothyroxine    HEME/ONC:  VTE prophylaxis: [] SCDs [] chemoprophylaxis [x] hold chemoprophylaxis due to:  fresh post op patient  [x] high risk of DVT/PE on admission due to:  Meingioma    ID:  afebrile    SOCIAL/FAMILY:  [x] awaiting [] updated at bedside [] family meeting    CODE STATUS:  [x] Full Code [] DNR [] DNI [] Palliative/Comfort Care    DISPOSITION:  [] ICU [] Stroke Unit [x] Floor [] EMU [] RCU [] PCU    [x] Patient is at high risk of neurologic deterioration/death due to:   resection of meningioma with risk of intracranial hemorrhage       high complexity     Contact: [phone]

## 2018-02-17 LAB
ANION GAP SERPL CALC-SCNC: 12 MMOL/L — SIGNIFICANT CHANGE UP (ref 5–17)
BUN SERPL-MCNC: 11 MG/DL — SIGNIFICANT CHANGE UP (ref 7–23)
CALCIUM SERPL-MCNC: 8.8 MG/DL — SIGNIFICANT CHANGE UP (ref 8.4–10.5)
CHLORIDE SERPL-SCNC: 104 MMOL/L — SIGNIFICANT CHANGE UP (ref 96–108)
CO2 SERPL-SCNC: 26 MMOL/L — SIGNIFICANT CHANGE UP (ref 22–31)
CREAT SERPL-MCNC: 0.62 MG/DL — SIGNIFICANT CHANGE UP (ref 0.5–1.3)
GLUCOSE SERPL-MCNC: 90 MG/DL — SIGNIFICANT CHANGE UP (ref 70–99)
HCT VFR BLD CALC: 36.6 % — SIGNIFICANT CHANGE UP (ref 34.5–45)
HGB BLD-MCNC: 12.6 G/DL — SIGNIFICANT CHANGE UP (ref 11.5–15.5)
MCHC RBC-ENTMCNC: 32.8 PG — SIGNIFICANT CHANGE UP (ref 27–34)
MCHC RBC-ENTMCNC: 34.4 GM/DL — SIGNIFICANT CHANGE UP (ref 32–36)
MCV RBC AUTO: 95.3 FL — SIGNIFICANT CHANGE UP (ref 80–100)
PLATELET # BLD AUTO: 237 K/UL — SIGNIFICANT CHANGE UP (ref 150–400)
POTASSIUM SERPL-MCNC: 4 MMOL/L — SIGNIFICANT CHANGE UP (ref 3.5–5.3)
POTASSIUM SERPL-SCNC: 4 MMOL/L — SIGNIFICANT CHANGE UP (ref 3.5–5.3)
RBC # BLD: 3.85 M/UL — SIGNIFICANT CHANGE UP (ref 3.8–5.2)
RBC # FLD: 11.5 % — SIGNIFICANT CHANGE UP (ref 10.3–14.5)
SODIUM SERPL-SCNC: 142 MMOL/L — SIGNIFICANT CHANGE UP (ref 135–145)
WBC # BLD: 10.1 K/UL — SIGNIFICANT CHANGE UP (ref 3.8–10.5)
WBC # FLD AUTO: 10.1 K/UL — SIGNIFICANT CHANGE UP (ref 3.8–10.5)

## 2018-02-17 RX ORDER — FAMOTIDINE 10 MG/ML
20 INJECTION INTRAVENOUS DAILY
Qty: 0 | Refills: 0 | Status: DISCONTINUED | OUTPATIENT
Start: 2018-02-17 | End: 2018-02-19

## 2018-02-17 RX ORDER — HYDROMORPHONE HYDROCHLORIDE 2 MG/ML
2 INJECTION INTRAMUSCULAR; INTRAVENOUS; SUBCUTANEOUS EVERY 4 HOURS
Qty: 0 | Refills: 0 | Status: DISCONTINUED | OUTPATIENT
Start: 2018-02-17 | End: 2018-02-18

## 2018-02-17 RX ORDER — FAMOTIDINE 10 MG/ML
20 INJECTION INTRAVENOUS DAILY
Qty: 0 | Refills: 0 | Status: DISCONTINUED | OUTPATIENT
Start: 2018-02-17 | End: 2018-02-17

## 2018-02-17 RX ORDER — SODIUM CHLORIDE 9 MG/ML
1000 INJECTION INTRAMUSCULAR; INTRAVENOUS; SUBCUTANEOUS ONCE
Qty: 0 | Refills: 0 | Status: COMPLETED | OUTPATIENT
Start: 2018-02-17 | End: 2018-02-17

## 2018-02-17 RX ORDER — HYDROMORPHONE HYDROCHLORIDE 2 MG/ML
0.5 INJECTION INTRAMUSCULAR; INTRAVENOUS; SUBCUTANEOUS EVERY 4 HOURS
Qty: 0 | Refills: 0 | Status: DISCONTINUED | OUTPATIENT
Start: 2018-02-17 | End: 2018-02-19

## 2018-02-17 RX ADMIN — LEVETIRACETAM 500 MILLIGRAM(S): 250 TABLET, FILM COATED ORAL at 05:51

## 2018-02-17 RX ADMIN — HYDROMORPHONE HYDROCHLORIDE 2 MILLIGRAM(S): 2 INJECTION INTRAMUSCULAR; INTRAVENOUS; SUBCUTANEOUS at 21:48

## 2018-02-17 RX ADMIN — Medication 1 TABLET(S): at 19:27

## 2018-02-17 RX ADMIN — Medication 1 TABLET(S): at 10:31

## 2018-02-17 RX ADMIN — Medication 1 TABLET(S): at 17:41

## 2018-02-17 RX ADMIN — Medication 1 TABLET(S): at 15:08

## 2018-02-17 RX ADMIN — Medication 1 TABLET(S): at 11:00

## 2018-02-17 RX ADMIN — Medication 1 TABLET(S): at 10:59

## 2018-02-17 RX ADMIN — OXYCODONE HYDROCHLORIDE 10 MILLIGRAM(S): 5 TABLET ORAL at 07:27

## 2018-02-17 RX ADMIN — HYDROMORPHONE HYDROCHLORIDE 0.5 MILLIGRAM(S): 2 INJECTION INTRAMUSCULAR; INTRAVENOUS; SUBCUTANEOUS at 17:43

## 2018-02-17 RX ADMIN — OXYCODONE HYDROCHLORIDE 10 MILLIGRAM(S): 5 TABLET ORAL at 06:54

## 2018-02-17 RX ADMIN — ENOXAPARIN SODIUM 40 MILLIGRAM(S): 100 INJECTION SUBCUTANEOUS at 21:06

## 2018-02-17 RX ADMIN — HYDROMORPHONE HYDROCHLORIDE 0.5 MILLIGRAM(S): 2 INJECTION INTRAMUSCULAR; INTRAVENOUS; SUBCUTANEOUS at 07:27

## 2018-02-17 RX ADMIN — CITALOPRAM 20 MILLIGRAM(S): 10 TABLET, FILM COATED ORAL at 11:18

## 2018-02-17 RX ADMIN — HYDROMORPHONE HYDROCHLORIDE 0.5 MILLIGRAM(S): 2 INJECTION INTRAMUSCULAR; INTRAVENOUS; SUBCUTANEOUS at 23:51

## 2018-02-17 RX ADMIN — LEVETIRACETAM 500 MILLIGRAM(S): 250 TABLET, FILM COATED ORAL at 17:40

## 2018-02-17 RX ADMIN — FAMOTIDINE 20 MILLIGRAM(S): 10 INJECTION INTRAVENOUS at 17:40

## 2018-02-17 RX ADMIN — Medication 100 MILLIGRAM(S): at 17:41

## 2018-02-17 RX ADMIN — ONDANSETRON 4 MILLIGRAM(S): 8 TABLET, FILM COATED ORAL at 06:54

## 2018-02-17 RX ADMIN — Medication 100 MILLIGRAM(S): at 05:51

## 2018-02-17 RX ADMIN — SODIUM CHLORIDE 1000 MILLILITER(S): 9 INJECTION INTRAMUSCULAR; INTRAVENOUS; SUBCUTANEOUS at 09:24

## 2018-02-17 RX ADMIN — ONDANSETRON 4 MILLIGRAM(S): 8 TABLET, FILM COATED ORAL at 00:21

## 2018-02-17 RX ADMIN — HYDROMORPHONE HYDROCHLORIDE 2 MILLIGRAM(S): 2 INJECTION INTRAMUSCULAR; INTRAVENOUS; SUBCUTANEOUS at 21:18

## 2018-02-17 RX ADMIN — Medication 1 TABLET(S): at 19:57

## 2018-02-17 RX ADMIN — HYDROMORPHONE HYDROCHLORIDE 0.5 MILLIGRAM(S): 2 INJECTION INTRAMUSCULAR; INTRAVENOUS; SUBCUTANEOUS at 17:50

## 2018-02-17 RX ADMIN — HYDROMORPHONE HYDROCHLORIDE 0.5 MILLIGRAM(S): 2 INJECTION INTRAMUSCULAR; INTRAVENOUS; SUBCUTANEOUS at 05:49

## 2018-02-17 RX ADMIN — Medication 1 TABLET(S): at 10:32

## 2018-02-17 RX ADMIN — HYDROMORPHONE HYDROCHLORIDE 0.5 MILLIGRAM(S): 2 INJECTION INTRAMUSCULAR; INTRAVENOUS; SUBCUTANEOUS at 09:45

## 2018-02-17 RX ADMIN — OXYCODONE HYDROCHLORIDE 10 MILLIGRAM(S): 5 TABLET ORAL at 00:16

## 2018-02-17 RX ADMIN — SENNA PLUS 1 TABLET(S): 8.6 TABLET ORAL at 11:18

## 2018-02-17 RX ADMIN — HYDROMORPHONE HYDROCHLORIDE 0.5 MILLIGRAM(S): 2 INJECTION INTRAMUSCULAR; INTRAVENOUS; SUBCUTANEOUS at 09:23

## 2018-02-17 RX ADMIN — Medication 100 MICROGRAM(S): at 05:51

## 2018-02-17 NOTE — PROGRESS NOTE ADULT - ASSESSMENT
63 y/o  female with PMHx of Hypothyroidism, obesity, depression, c/h  headaches worsening, known meningioma, with increase in size s/p Right frontal craniotomy and gross total excision of dural based extra-axial tumor 2/15/18      Plan;  Neuro stable. Fioricet prn  for headaches. NS bolus x 1 given  Vitals stable  PT- Home PT  Possible d/c home yosi   DVT ppx

## 2018-02-17 NOTE — PHYSICAL THERAPY INITIAL EVALUATION ADULT - ADDITIONAL COMMENTS
Lives alone and was independent in her personal care PTA. [place] in [place] with +10 steps to enter. Prior to admission, pt was independent in her personal care and ambulation without an AD. Pt states her daughter and friends will be available to assist her upon d/c.

## 2018-02-17 NOTE — PROGRESS NOTE ADULT - SUBJECTIVE AND OBJECTIVE BOX
SUBJECTIVE:   c/o R frontal headaches.   OVERNIGHT EVENTS: none    Vital Signs Last 24 Hrs  T(C): 37.1 (17 Feb 2018 11:30), Max: 37.2 (16 Feb 2018 19:35)  T(F): 98.8 (17 Feb 2018 11:30), Max: 99 (17 Feb 2018 04:57)  HR: 72 (17 Feb 2018 14:54) (60 - 76)  BP: 128/74 (17 Feb 2018 14:54) (108/66 - 128/74)  RR: 18 (17 Feb 2018 11:30) (16 - 18)  SpO2: 94% (17 Feb 2018 14:54) (93% - 98%)    PHYSICAL EXAM:    Constitutional: No Acute Distress     Neurological: AOx3, Following Commands, Moving all Extremities 5/5. RUPINDER EOMI Speech clear  R cranial staples C/D/I. HMV- 240cc serous drainage    Pulmonary: Clear to Auscultation,     Cardiovascular: S1, S2, Regular rate and rhythm     Gastrointestinal: Soft, Non-tender, Non-distended     Extremities: No calf tenderness         LABS:                        12.6   10.1  )-----------( 237      ( 17 Feb 2018 06:40 )             36.6    02-17    142  |  104  |  11  ----------------------------<  90  4.0   |  26  |  0.62    Ca    8.8      17 Feb 2018 12:14          IMAGING:         MEDICATIONS:    acetaminophen 325 mG/butalbital 50 mG/caffeine 40 mG 1 Tablet(s) Oral every 4 hours PRN headache  citalopram 20 milliGRAM(s) Oral daily  HYDROmorphone   Tablet 2 milliGRAM(s) Oral every 4 hours PRN Severe Pain (7 - 10)  HYDROmorphone  Injectable 0.5 milliGRAM(s) IV Push every 4 hours PRN breakthrough pain  levETIRAcetam 500 milliGRAM(s) Oral every 12 hours  ondansetron Injectable 4 milliGRAM(s) IV Push every 6 hours PRN Nausea and/or Vomiting  docusate sodium 100 milliGRAM(s) Oral two times a day  senna 1 Tablet(s) Oral daily  enoxaparin Injectable 40 milliGRAM(s) SubCutaneous at bedtime  levothyroxine 100 MICROGram(s) Oral daily      DIET:

## 2018-02-17 NOTE — PROGRESS NOTE ADULT - SUBJECTIVE AND OBJECTIVE BOX
POD2 from excision of a right frontal meningioma. On Floor.    S: Continues to c/o headaches. She has had migraines for years. Uses Motrin, caffeinated drinks and a dark room. She is in a darkened room. Otherwise no problems.  O: Drain removed today. Very little swelling. Neurologically normal. CT looked good.  A: Doing well except for headaches  P: Fioricet for headache control. To observe in house for another day or two.

## 2018-02-17 NOTE — PHYSICAL THERAPY INITIAL EVALUATION ADULT - PERTINENT HX OF CURRENT PROBLEM, REHAB EVAL
63 y/o F presents for Right Frontal Craniotomy for excision of meningioma with sterotaxy on 2/15/18. S/p R craniotomy with tumor excision 2/15. CTH 2/16: Interval right frontal postoperative changes.

## 2018-02-17 NOTE — PROGRESS NOTE ADULT - SUBJECTIVE AND OBJECTIVE BOX
Post-Anesthetic Evaluation:    The Patient was interviewed and evaluated    Vital Signs Last 24 Hrs  T(C): 37.1 (17 Feb 2018 11:30), Max: 37.2 (16 Feb 2018 19:35)  T(F): 98.8 (17 Feb 2018 11:30), Max: 99 (17 Feb 2018 04:57)  HR: 72 (17 Feb 2018 14:54) (60 - 76)  BP: 128/74 (17 Feb 2018 14:54) (108/66 - 128/74)  BP(mean): --  RR: 18 (17 Feb 2018 11:30) (16 - 18)  SpO2: 94% (17 Feb 2018 14:54) (93% - 98%)    Evaluation:      (X) No apparent complications or complaints regarding anesthesia care at this time  (X) All questions were answered    Condition:  (X) Stable      ( ) Guarded      ( ) Critical    Recommendations:  (X) None     ( ) Other:

## 2018-02-18 DIAGNOSIS — F41.9 ANXIETY DISORDER, UNSPECIFIED: ICD-10-CM

## 2018-02-18 DIAGNOSIS — G43.909 MIGRAINE, UNSPECIFIED, NOT INTRACTABLE, WITHOUT STATUS MIGRAINOSUS: ICD-10-CM

## 2018-02-18 DIAGNOSIS — E03.9 HYPOTHYROIDISM, UNSPECIFIED: ICD-10-CM

## 2018-02-18 LAB
GLUCOSE BLDC GLUCOMTR-MCNC: 115 MG/DL — HIGH (ref 70–99)
GLUCOSE BLDC GLUCOMTR-MCNC: 129 MG/DL — HIGH (ref 70–99)

## 2018-02-18 PROCEDURE — 99223 1ST HOSP IP/OBS HIGH 75: CPT

## 2018-02-18 RX ORDER — ACETAMINOPHEN 500 MG
650 TABLET ORAL EVERY 6 HOURS
Qty: 0 | Refills: 0 | Status: DISCONTINUED | OUTPATIENT
Start: 2018-02-18 | End: 2018-02-19

## 2018-02-18 RX ORDER — GLUCAGON INJECTION, SOLUTION 0.5 MG/.1ML
1 INJECTION, SOLUTION SUBCUTANEOUS ONCE
Qty: 0 | Refills: 0 | Status: DISCONTINUED | OUTPATIENT
Start: 2018-02-18 | End: 2018-02-19

## 2018-02-18 RX ORDER — DEXTROSE 50 % IN WATER 50 %
12.5 SYRINGE (ML) INTRAVENOUS ONCE
Qty: 0 | Refills: 0 | Status: DISCONTINUED | OUTPATIENT
Start: 2018-02-18 | End: 2018-02-19

## 2018-02-18 RX ORDER — HYDROMORPHONE HYDROCHLORIDE 2 MG/ML
4 INJECTION INTRAMUSCULAR; INTRAVENOUS; SUBCUTANEOUS EVERY 4 HOURS
Qty: 0 | Refills: 0 | Status: DISCONTINUED | OUTPATIENT
Start: 2018-02-18 | End: 2018-02-19

## 2018-02-18 RX ORDER — DEXTROSE 50 % IN WATER 50 %
25 SYRINGE (ML) INTRAVENOUS ONCE
Qty: 0 | Refills: 0 | Status: DISCONTINUED | OUTPATIENT
Start: 2018-02-18 | End: 2018-02-19

## 2018-02-18 RX ORDER — INSULIN LISPRO 100/ML
VIAL (ML) SUBCUTANEOUS
Qty: 0 | Refills: 0 | Status: DISCONTINUED | OUTPATIENT
Start: 2018-02-18 | End: 2018-02-19

## 2018-02-18 RX ORDER — INSULIN LISPRO 100/ML
VIAL (ML) SUBCUTANEOUS AT BEDTIME
Qty: 0 | Refills: 0 | Status: DISCONTINUED | OUTPATIENT
Start: 2018-02-18 | End: 2018-02-19

## 2018-02-18 RX ORDER — HYDROMORPHONE HYDROCHLORIDE 2 MG/ML
2 INJECTION INTRAMUSCULAR; INTRAVENOUS; SUBCUTANEOUS EVERY 4 HOURS
Qty: 0 | Refills: 0 | Status: DISCONTINUED | OUTPATIENT
Start: 2018-02-18 | End: 2018-02-19

## 2018-02-18 RX ORDER — ACETAMINOPHEN 500 MG
1000 TABLET ORAL ONCE
Qty: 0 | Refills: 0 | Status: COMPLETED | OUTPATIENT
Start: 2018-02-18 | End: 2018-02-18

## 2018-02-18 RX ORDER — SODIUM CHLORIDE 9 MG/ML
1000 INJECTION, SOLUTION INTRAVENOUS
Qty: 0 | Refills: 0 | Status: DISCONTINUED | OUTPATIENT
Start: 2018-02-18 | End: 2018-02-19

## 2018-02-18 RX ORDER — DEXTROSE 50 % IN WATER 50 %
1 SYRINGE (ML) INTRAVENOUS ONCE
Qty: 0 | Refills: 0 | Status: DISCONTINUED | OUTPATIENT
Start: 2018-02-18 | End: 2018-02-19

## 2018-02-18 RX ORDER — DEXAMETHASONE 0.5 MG/5ML
4 ELIXIR ORAL THREE TIMES A DAY
Qty: 0 | Refills: 0 | Status: DISCONTINUED | OUTPATIENT
Start: 2018-02-18 | End: 2018-02-19

## 2018-02-18 RX ADMIN — Medication 100 MICROGRAM(S): at 05:06

## 2018-02-18 RX ADMIN — HYDROMORPHONE HYDROCHLORIDE 4 MILLIGRAM(S): 2 INJECTION INTRAMUSCULAR; INTRAVENOUS; SUBCUTANEOUS at 13:00

## 2018-02-18 RX ADMIN — HYDROMORPHONE HYDROCHLORIDE 0.5 MILLIGRAM(S): 2 INJECTION INTRAMUSCULAR; INTRAVENOUS; SUBCUTANEOUS at 18:47

## 2018-02-18 RX ADMIN — FAMOTIDINE 20 MILLIGRAM(S): 10 INJECTION INTRAVENOUS at 11:02

## 2018-02-18 RX ADMIN — HYDROMORPHONE HYDROCHLORIDE 4 MILLIGRAM(S): 2 INJECTION INTRAMUSCULAR; INTRAVENOUS; SUBCUTANEOUS at 09:33

## 2018-02-18 RX ADMIN — ENOXAPARIN SODIUM 40 MILLIGRAM(S): 100 INJECTION SUBCUTANEOUS at 22:04

## 2018-02-18 RX ADMIN — Medication 1 TABLET(S): at 04:08

## 2018-02-18 RX ADMIN — Medication 1000 MILLIGRAM(S): at 12:59

## 2018-02-18 RX ADMIN — Medication 100 MILLIGRAM(S): at 17:11

## 2018-02-18 RX ADMIN — Medication 400 MILLIGRAM(S): at 11:02

## 2018-02-18 RX ADMIN — HYDROMORPHONE HYDROCHLORIDE 0.5 MILLIGRAM(S): 2 INJECTION INTRAMUSCULAR; INTRAVENOUS; SUBCUTANEOUS at 00:10

## 2018-02-18 RX ADMIN — CITALOPRAM 20 MILLIGRAM(S): 10 TABLET, FILM COATED ORAL at 11:02

## 2018-02-18 RX ADMIN — HYDROMORPHONE HYDROCHLORIDE 0.5 MILLIGRAM(S): 2 INJECTION INTRAMUSCULAR; INTRAVENOUS; SUBCUTANEOUS at 00:00

## 2018-02-18 RX ADMIN — HYDROMORPHONE HYDROCHLORIDE 0.5 MILLIGRAM(S): 2 INJECTION INTRAMUSCULAR; INTRAVENOUS; SUBCUTANEOUS at 08:35

## 2018-02-18 RX ADMIN — LEVETIRACETAM 500 MILLIGRAM(S): 250 TABLET, FILM COATED ORAL at 17:11

## 2018-02-18 RX ADMIN — Medication 1 TABLET(S): at 17:11

## 2018-02-18 RX ADMIN — LEVETIRACETAM 500 MILLIGRAM(S): 250 TABLET, FILM COATED ORAL at 05:06

## 2018-02-18 RX ADMIN — ONDANSETRON 4 MILLIGRAM(S): 8 TABLET, FILM COATED ORAL at 08:45

## 2018-02-18 RX ADMIN — HYDROMORPHONE HYDROCHLORIDE 2 MILLIGRAM(S): 2 INJECTION INTRAMUSCULAR; INTRAVENOUS; SUBCUTANEOUS at 05:07

## 2018-02-18 RX ADMIN — Medication 4 MILLIGRAM(S): at 17:11

## 2018-02-18 RX ADMIN — HYDROMORPHONE HYDROCHLORIDE 2 MILLIGRAM(S): 2 INJECTION INTRAMUSCULAR; INTRAVENOUS; SUBCUTANEOUS at 05:40

## 2018-02-18 RX ADMIN — HYDROMORPHONE HYDROCHLORIDE 0.5 MILLIGRAM(S): 2 INJECTION INTRAMUSCULAR; INTRAVENOUS; SUBCUTANEOUS at 08:28

## 2018-02-18 RX ADMIN — Medication 650 MILLIGRAM(S): at 16:42

## 2018-02-18 RX ADMIN — Medication 4 MILLIGRAM(S): at 09:28

## 2018-02-18 RX ADMIN — Medication 1 TABLET(S): at 16:41

## 2018-02-18 RX ADMIN — Medication 650 MILLIGRAM(S): at 17:11

## 2018-02-18 RX ADMIN — Medication 100 MILLIGRAM(S): at 05:06

## 2018-02-18 RX ADMIN — SENNA PLUS 1 TABLET(S): 8.6 TABLET ORAL at 11:02

## 2018-02-18 RX ADMIN — Medication 1 TABLET(S): at 03:38

## 2018-02-18 NOTE — CONSULT NOTE ADULT - SUBJECTIVE AND OBJECTIVE BOX
Lucius Alvarez MD  ([facility] Hospitalist)  Page [phone])    PMD: Dr Samia Vazquez    HPI:  63 y/o Female with PMH as below, presents with progressively worsening headaches for many years. Diagnostic imaging revealed a meningioma. S/p Right Frontal Craniotomy for tumor with stereotaxy on 2/15/18.    PAST MEDICAL & SURGICAL HISTORY:  Obesity (BMI 33.8)  Hypothyroidism  Meniere disease  Depression  Melanoma: Right LE  Migraine: HA  Meningioma   delivery delivered  Carpal tunnel syndrome of right wrist  S/P T&A (status post tonsillectomy and adenoidectomy)  H/O melanoma excision: in right Lower ext  Corneal transplant status: left    REVIEW OF SYMPTOMS:  CONSTITUTIONAL: No fever, weight loss, or fatigue  EYES: No eye pain, visual disturbances, or discharge  ENMT:  No difficulty hearing, tinnitus, vertigo; No sinus or throat pain  NECK: No pain or stiffness  BREASTS: No pain, masses, or nipple discharge  RESPIRATORY: No cough, wheezing, chills or hemoptysis; No shortness of breath  CARDIOVASCULAR: No chest pain, palpitations, dizziness, or leg swelling  GASTROINTESTINAL: No abdominal or epigastric pain. No nausea, vomiting, or hematemesis; No diarrhea or constipation. No melena or hematochezia.  GENITOURINARY: No dysuria, frequency, hematuria, or incontinence  NEUROLOGICAL: No headaches, memory loss, loss of strength, numbness, or tremors  SKIN: No itching, burning, rashes, or lesions   LYMPH NODES: No enlarged glands  ENDOCRINE: No heat or cold intolerance; No hair loss  MUSCULOSKELETAL: No joint pain or swelling; No muscle, back, or extremity pain  PSYCHIATRIC: No depression, anxiety, mood swings, or difficulty sleeping  HEME/LYMPH: No easy bruising, or bleeding gums  ALLERGIC AND IMMUNOLOGIC: No hives or eczema    ALLERGIES:    penicillin (Rash)  sulfa drugs (Rash)    SOCIAL HISTORY:   , lives alone.  Denies smoking    FAMILY HISTORY:      HOME MEDICATIONS:  levothyroxine 100 mcg PO daily  CeleXA 20 mg PO dialy    INPATIENT MEDICATIONS  (STANDING):  enoxaparin Injectable 40 milliGRAM(s) SubCutaneous at bedtime  dexamethasone     Tablet 4 milliGRAM(s) Oral three times a day  levETIRAcetam 500 milliGRAM(s) Oral every 12 hours  famotidine    Tablet 20 milliGRAM(s) Oral daily  insulin lispro (HumaLOG) corrective regimen sliding scale   SubCutaneous three times a day before meals  insulin lispro (HumaLOG) corrective regimen sliding scale   SubCutaneous at bedtime  levothyroxine 100 MICROGram(s) Oral daily  citalopram 20 milliGRAM(s) Oral daily  senna 1 Tablet(s) Oral daily  docusate sodium 100 milliGRAM(s) Oral two times a day    MEDICATIONS  (PRN):  acetaminophen 325 mG/butalbital 50 mG/caffeine 40 mG 1 Tablet(s) Oral every 4 hours PRN headache  aluminum hydroxide/magnesium hydroxide/simethicone Suspension 30 milliLiter(s) Oral every 4 hours PRN Dyspepsia  dextrose Gel 1 Dose(s) Oral once PRN Blood Glucose LESS THAN 70 milliGRAM(s)/deciliter  glucagon  Injectable 1 milliGRAM(s) IntraMuscular once PRN Glucose LESS THAN 70 milligrams/deciliter  HYDROmorphone   Tablet 2 milliGRAM(s) Oral every 4 hours PRN Moderate Pain (4 - 6)  HYDROmorphone   Tablet 4 milliGRAM(s) Oral every 4 hours PRN Severe Pain (7 - 10)  HYDROmorphone  Injectable 0.5 milliGRAM(s) IV Push every 4 hours PRN breakthrough pain  ondansetron Injectable 4 milliGRAM(s) IV Push every 6 hours PRN Nausea and/or Vomiting      Vital Signs Last 24 Hrs  T(C): 37.1 (2018 08:43), Max: 37.1 (2018 21:00)  T(F): 98.8 (2018 08:43), Max: 98.8 (2018 23:47)  HR: 61 (2018 08:43) (61 - 72)  BP: 118/78 (2018 08:43) (110/67 - 136/68)  BP(mean): --  RR: 18 (2018 08:43) (18 - 18)  SpO2: 99% (2018 08:43) (93% - 99%)    I&O's Summary  2018 07:01  -  2018 07:00  --------------------------------------------------------  IN: 720 mL / OUT: 0 mL / NET: 720 mL      PHYSICAL EXAM:  GENERAL: NAD  HEENT:  Atraumatic, Normocephalic, MMM, neck supple, no JVD  EYES: EOMI, PERRLA, conjunctiva and sclera clear  CHEST/LUNG: Clear to auscultation bilaterally; No wheeze, rhonchi or rales.  HEART: S1, S2, rrr; No murmurs, rubs, or gallops  ABDOMEN: Soft, Nontender, Nondistended; Bowel sounds present  EXTREMITIES:  2+ Peripheral Pulses, No clubbing, cyanosis, or edema  PSYCH: calm  NEUROLOGY: non-focal, AOx3  SKIN: No rashes or lesions    LABS:             12.6   10.1  )-----------( 237      ( 2018 06:40 )             36.6     02-17    142  |  104  |  11  ----------------------------<  90  4.0   |  26  |  0.62    Ca    8.8      2018 12:14        RADIOLOGY & ADDITIONAL TESTS:    Imaging Personally Reviewed:   CT Head : Interval removal of a right frontal meningioma. Right frontal craniotomy changes with overlying scalp swelling and subjacent expected postoperative changes including extra-axial fluid, hematoma, and air measuring a maximal thickness of 1.6 cm exerting localized mass effect on the frontal lobe. No midline shift. Surgical drain within the scalp.    ECG reviewed and interpreted: NSR at 66 bpm, no ischemia    Consultant(s) Notes Reviewed:  Neurosurgery    Care Discussed with Consultants/Other Providers: Neurosurgery re plan of care Lucius Alvarez MD  ([facility] Hospitalist)  Page [phone])    PMD: Dr Samia Vazquez    HPI:  61 y/o Female with PMH as below, presents with progressively worsening headaches for many years. Diagnostic imaging revealed a meningioma. S/p Right Frontal Craniotomy for tumor with stereotaxy on 2/15/18. After the surgery the patient developed a severe headache, that she rates 10/10 severity since last night. It is associated with nausea. She received Fioricet earlier today, which brought the pain down to a 4/10. Wants to stay off dilaudid PO/IV before being discharged home.     PAST MEDICAL & SURGICAL HISTORY:  Obesity (BMI 33.8)  Hypothyroidism  Meniere disease  Anxiety  Melanoma: Right LE  Migraine: HA  Meningioma   delivery delivered  Carpal tunnel syndrome of right wrist  S/P T&A (status post tonsillectomy and adenoidectomy)  H/O melanoma excision: in right Lower ext  Corneal transplant status: left    REVIEW OF SYMPTOMS:  CONSTITUTIONAL: No fever, weight loss, or fatigue  EYES: No eye pain, visual disturbances, or discharge  ENMT:  No difficulty hearing. +Tinnitus, vertigo associated with Meniere's disease. No sinus or throat pain  NECK: No pain or stiffness  RESPIRATORY: No cough, wheezing, chills or hemoptysis; No shortness of breath  CARDIOVASCULAR: No chest pain, palpitations, dizziness, or leg swelling  GASTROINTESTINAL: No abdominal or epigastric pain. +Nausea. No vomiting, or hematemesis; No diarrhea or constipation. No melena or hematochezia.  GENITOURINARY: No dysuria, frequency, hematuria, or incontinence  NEUROLOGICAL: + headaches. No memory loss, loss of strength, numbness, or tremors  SKIN: No itching, burning, rashes, or lesions   MUSCULOSKELETAL: No joint pain or swelling; No muscle, back, or extremity pain  PSYCHIATRIC: No depression, anxiety, mood swings, or difficulty sleeping  HEME/LYMPH: No easy bruising, or bleeding gums  ALLERGIC AND IMMUNOLOGIC: No hives or eczema    ALLERGIES:    penicillin (Rash)  sulfa drugs (Rash)    SOCIAL HISTORY:   , lives alone.  Denies smoking, alcohol, and drug use.    FAMILY HISTORY:  Unknown - she was adopted    HOME MEDICATIONS:  levothyroxine 100 mcg PO daily  CeleXA 20 mg PO dialy    INPATIENT MEDICATIONS  (STANDING):  enoxaparin Injectable 40 milliGRAM(s) SubCutaneous at bedtime  dexamethasone     Tablet 4 milliGRAM(s) Oral three times a day  levETIRAcetam 500 milliGRAM(s) Oral every 12 hours  famotidine    Tablet 20 milliGRAM(s) Oral daily  insulin lispro (HumaLOG) corrective regimen sliding scale   SubCutaneous three times a day before meals  insulin lispro (HumaLOG) corrective regimen sliding scale   SubCutaneous at bedtime  levothyroxine 100 MICROGram(s) Oral daily  citalopram 20 milliGRAM(s) Oral daily  senna 1 Tablet(s) Oral daily  docusate sodium 100 milliGRAM(s) Oral two times a day    MEDICATIONS  (PRN):  acetaminophen 325 mG/butalbital 50 mG/caffeine 40 mG 1 Tablet(s) Oral every 4 hours PRN headache  aluminum hydroxide/magnesium hydroxide/simethicone Suspension 30 milliLiter(s) Oral every 4 hours PRN Dyspepsia  dextrose Gel 1 Dose(s) Oral once PRN Blood Glucose LESS THAN 70 milliGRAM(s)/deciliter  glucagon  Injectable 1 milliGRAM(s) IntraMuscular once PRN Glucose LESS THAN 70 milligrams/deciliter  HYDROmorphone   Tablet 2 milliGRAM(s) Oral every 4 hours PRN Moderate Pain (4 - 6)  HYDROmorphone   Tablet 4 milliGRAM(s) Oral every 4 hours PRN Severe Pain (7 - 10)  HYDROmorphone  Injectable 0.5 milliGRAM(s) IV Push every 4 hours PRN breakthrough pain  ondansetron Injectable 4 milliGRAM(s) IV Push every 6 hours PRN Nausea and/or Vomiting      Vital Signs Last 24 Hrs  T(C): 37.1 (2018 08:43), Max: 37.1 (2018 21:00)  T(F): 98.8 (2018 08:43), Max: 98.8 (2018 23:47)  HR: 61 (2018 08:43) (61 - 72)  BP: 118/78 (2018 08:43) (110/67 - 136/68)  BP(mean): --  RR: 18 (2018 08:43) (18 - 18)  SpO2: 99% (2018 08:43) (93% - 99%)    I&O's Summary  2018 07:01  -  2018 07:00  --------------------------------------------------------  IN: 720 mL / OUT: 0 mL / NET: 720 mL      PHYSICAL EXAM:  GENERAL: NAD  HEENT:  Atraumatic, Normocephalic, MMM, neck supple, no JVD  EYES: EOMI, PERRLA, conjunctiva and sclera clear  CHEST/LUNG: Clear to auscultation bilaterally; No wheeze, rhonchi or rales.  HEART: S1, S2, rrr; No murmurs, rubs, or gallops  ABDOMEN: Soft, Nontender, Nondistended; Bowel sounds present  EXTREMITIES:  2+ Peripheral Pulses, No clubbing, cyanosis, or edema  PSYCH: calm  NEUROLOGY: non-focal, AOx3  SKIN: No rashes or lesions    LABS:             12.6   10.1  )-----------( 237      ( 2018 06:40 )             36.6     02-17    142  |  104  |  11  ----------------------------<  90  4.0   |  26  |  0.62    Ca    8.8      2018 12:14        RADIOLOGY & ADDITIONAL TESTS:    Imaging Personally Reviewed:   CT Head : Interval removal of a right frontal meningioma. Right frontal craniotomy changes with overlying scalp swelling and subjacent expected postoperative changes including extra-axial fluid, hematoma, and air measuring a maximal thickness of 1.6 cm exerting localized mass effect on the frontal lobe. No midline shift. Surgical drain within the scalp.    ECG reviewed and interpreted: NSR at 66 bpm, no ischemia    Consultant(s) Notes Reviewed:  Neurosurgery    Care Discussed with Consultants/Other Providers: Neurosurgery re plan of care Lucius Alvarez MD  ([facility] Hospitalist)  Page [phone] ([phone])    PMD: Dr Samia Vazquez    HPI:  63 y/o Female with PMH as below, presents with progressively worsening headaches for many years. Diagnostic imaging revealed a meningioma. S/p Right Frontal Craniotomy for tumor with stereotaxy on 2/15/18. After the surgery the patient developed a severe headache, that she rates 10/10 severity since last night. It is associated with nausea. She received Fioricet earlier today, which brought the pain down to a 4/10. Wants to stay off dilaudid PO/IV before being discharged home.     PAST MEDICAL & SURGICAL HISTORY:  Obesity (BMI 33.8)  Hypothyroidism  Meniere disease  Anxiety  Melanoma: Right LE  Migraine: HA  Meningioma   delivery delivered  Carpal tunnel syndrome of right wrist  S/P T&A (status post tonsillectomy and adenoidectomy)  H/O melanoma excision: in right Lower ext  Corneal transplant status: left    REVIEW OF SYMPTOMS:  CONSTITUTIONAL: No fever, weight loss, or fatigue  EYES: No eye pain, visual disturbances, or discharge  ENMT:  No difficulty hearing. +Tinnitus, vertigo associated with Meniere's disease. No sinus or throat pain  NECK: No pain or stiffness  RESPIRATORY: No cough, wheezing, chills or hemoptysis; No shortness of breath  CARDIOVASCULAR: No chest pain, palpitations, dizziness, or leg swelling  GASTROINTESTINAL: No abdominal or epigastric pain. +Nausea. No vomiting, or hematemesis; No diarrhea or constipation. No melena or hematochezia.  GENITOURINARY: No dysuria, frequency, hematuria, or incontinence  NEUROLOGICAL: + headaches. No memory loss, loss of strength, numbness, or tremors  SKIN: No itching, burning, rashes, or lesions   MUSCULOSKELETAL: No joint pain or swelling; No muscle, back, or extremity pain  PSYCHIATRIC: No depression, anxiety, mood swings, or difficulty sleeping  HEME/LYMPH: No easy bruising, or bleeding gums  ALLERGIC AND IMMUNOLOGIC: No hives or eczema    ALLERGIES:    penicillin (Rash)  sulfa drugs (Rash)    SOCIAL HISTORY:   , lives alone.  Denies smoking, alcohol, and drug use.    FAMILY HISTORY:  Unknown - she was adopted    HOME MEDICATIONS:  levothyroxine 100 mcg PO daily  CeleXA 20 mg PO dialy    INPATIENT MEDICATIONS  (STANDING):  enoxaparin Injectable 40 milliGRAM(s) SubCutaneous at bedtime  dexamethasone     Tablet 4 milliGRAM(s) Oral three times a day  levETIRAcetam 500 milliGRAM(s) Oral every 12 hours  famotidine    Tablet 20 milliGRAM(s) Oral daily  insulin lispro (HumaLOG) corrective regimen sliding scale   SubCutaneous three times a day before meals  insulin lispro (HumaLOG) corrective regimen sliding scale   SubCutaneous at bedtime  levothyroxine 100 MICROGram(s) Oral daily  citalopram 20 milliGRAM(s) Oral daily  senna 1 Tablet(s) Oral daily  docusate sodium 100 milliGRAM(s) Oral two times a day    MEDICATIONS  (PRN):  acetaminophen 325 mG/butalbital 50 mG/caffeine 40 mG 1 Tablet(s) Oral every 4 hours PRN headache  aluminum hydroxide/magnesium hydroxide/simethicone Suspension 30 milliLiter(s) Oral every 4 hours PRN Dyspepsia  dextrose Gel 1 Dose(s) Oral once PRN Blood Glucose LESS THAN 70 milliGRAM(s)/deciliter  glucagon  Injectable 1 milliGRAM(s) IntraMuscular once PRN Glucose LESS THAN 70 milligrams/deciliter  HYDROmorphone   Tablet 2 milliGRAM(s) Oral every 4 hours PRN Moderate Pain (4 - 6)  HYDROmorphone   Tablet 4 milliGRAM(s) Oral every 4 hours PRN Severe Pain (7 - 10)  HYDROmorphone  Injectable 0.5 milliGRAM(s) IV Push every 4 hours PRN breakthrough pain  ondansetron Injectable 4 milliGRAM(s) IV Push every 6 hours PRN Nausea and/or Vomiting      Vital Signs Last 24 Hrs  T(C): 37.1 (2018 08:43), Max: 37.1 (2018 21:00)  T(F): 98.8 (2018 08:43), Max: 98.8 (2018 23:47)  HR: 61 (2018 08:43) (61 - 72)  BP: 118/78 (2018 08:43) (110/67 - 136/68)  BP(mean): --  RR: 18 (2018 08:43) (18 - 18)  SpO2: 99% (2018 08:43) (93% - 99%)    I&O's Summary  2018 07:01  -  2018 07:00  --------------------------------------------------------  IN: 720 mL / OUT: 0 mL / NET: 720 mL      PHYSICAL EXAM:  GENERAL: NAD  HEENT:  s/p right crani, periorbital edema and bruising post-op, MMM, neck supple, no JVD  EYES: EOMI, PERRLA, conjunctiva and sclera clear  CHEST/LUNG: Clear to auscultation bilaterally; No wheeze, rhonchi or rales.  HEART: S1, S2, rrr; No murmurs, rubs, or gallops  ABDOMEN: Soft, Nontender, Nondistended; Bowel sounds present  EXTREMITIES:  2+ Peripheral Pulses, No clubbing, cyanosis, or edema  PSYCH: calm  NEUROLOGY: non-focal, AOx3  SKIN: No rashes or lesions    LABS:             12.6   10.1  )-----------( 237      ( 2018 06:40 )             36.6     02-17    142  |  104  |  11  ----------------------------<  90  4.0   |  26  |  0.62    Ca    8.8      2018 12:14        RADIOLOGY & ADDITIONAL TESTS:    Imaging Personally Reviewed:   CT Head : Interval removal of a right frontal meningioma. Right frontal craniotomy changes with overlying scalp swelling and subjacent expected postoperative changes including extra-axial fluid, hematoma, and air measuring a maximal thickness of 1.6 cm exerting localized mass effect on the frontal lobe. No midline shift. Surgical drain within the scalp.    ECG reviewed and interpreted: NSR at 66 bpm, no ischemia    Consultant(s) Notes Reviewed:  Neurosurgery    Care Discussed with Consultants/Other Providers: Neurosurgery re plan of care

## 2018-02-18 NOTE — PROGRESS NOTE ADULT - ASSESSMENT
61 y/o  female with PMHx of Hypothyroidism, obesity, depression, now presents with headaches for many years which was getting worse lately, diagnostic  imaging reveals a meningioma, seen & evaluated by neurosurgeon & now recommends a Right Frontal Craniotomy for tumor with sterotaxy on 2/15/18.     PROCEDURE: 2/15 s/p right craniotomy for Brain tumor excision     POD#3    PLAN:  Neuro:   Respiratory:   CV:  Endocrine:   Heme/Onc:             DVT ppx:   Renal:   ID:   GI:    PT/OT:   Will discuss with ____      Spectralink # 61 y/o  female with PMHx of Hypothyroidism, obesity, depression, now presents with headaches for many years which was getting worse lately, diagnostic  imaging reveals a meningioma, seen & evaluated by neurosurgeon & now recommends a Right Frontal Craniotomy for tumor with sterotaxy on 2/15/18.     PROCEDURE: 2/15 s/p right craniotomy for Brain tumor excision     POD#3    PLAN:  Neuro:   -pain control- pt c/o severe headache requiring multiple doses of IV dilaudid   - started decadron 4q8, given IV tylenol, increased dose of po dilaudid, fioricet prn  - continue keppra for seizure prophylaxis  Respiratory:   - encouraged incentive spirometry  Endocrine:   - hypothyroid- continue synthroid      DVT ppx: venodynes, chemoprophylaxis  GI:  tolerating po diet  - bowel regimen  PT/OT:   - Home PT      [employer] # [id number]

## 2018-02-18 NOTE — CONSULT NOTE ADULT - ASSESSMENT
61 y/o Female with Obesity (BMI 33.8), Hypothyroidism, Meniere disease, Depression, Melanoma (RLE), Migraine headaches, Meningioma, s/p Right Frontal Craniotomy for tumor with stereotaxy on 2/15/18. 63 y/o Female with Obesity (BMI 33.8), Hypothyroidism, Meniere disease, Anxiety, Melanoma (RLE), Migraine headaches, Meningioma, s/p Right Frontal Craniotomy for tumor with stereotaxy on 2/15/18.

## 2018-02-19 ENCOUNTER — TRANSCRIPTION ENCOUNTER (OUTPATIENT)
Age: 63
End: 2018-02-19

## 2018-02-19 VITALS
DIASTOLIC BLOOD PRESSURE: 68 MMHG | RESPIRATION RATE: 18 BRPM | TEMPERATURE: 98 F | HEART RATE: 80 BPM | SYSTOLIC BLOOD PRESSURE: 127 MMHG | OXYGEN SATURATION: 98 %

## 2018-02-19 LAB
GLUCOSE BLDC GLUCOMTR-MCNC: 103 MG/DL — HIGH (ref 70–99)
GLUCOSE BLDC GLUCOMTR-MCNC: 114 MG/DL — HIGH (ref 70–99)
HBA1C BLD-MCNC: 5.2 % — SIGNIFICANT CHANGE UP (ref 4–5.6)

## 2018-02-19 PROCEDURE — C1889: CPT

## 2018-02-19 PROCEDURE — 84100 ASSAY OF PHOSPHORUS: CPT

## 2018-02-19 PROCEDURE — 88307 TISSUE EXAM BY PATHOLOGIST: CPT

## 2018-02-19 PROCEDURE — 80048 BASIC METABOLIC PNL TOTAL CA: CPT

## 2018-02-19 PROCEDURE — C1769: CPT

## 2018-02-19 PROCEDURE — 83735 ASSAY OF MAGNESIUM: CPT

## 2018-02-19 PROCEDURE — C1713: CPT

## 2018-02-19 PROCEDURE — 88342 IMHCHEM/IMCYTCHM 1ST ANTB: CPT

## 2018-02-19 PROCEDURE — 97165 OT EVAL LOW COMPLEX 30 MIN: CPT

## 2018-02-19 PROCEDURE — 85027 COMPLETE CBC AUTOMATED: CPT

## 2018-02-19 PROCEDURE — 82962 GLUCOSE BLOOD TEST: CPT

## 2018-02-19 PROCEDURE — 70450 CT HEAD/BRAIN W/O DYE: CPT

## 2018-02-19 PROCEDURE — 88360 TUMOR IMMUNOHISTOCHEM/MANUAL: CPT

## 2018-02-19 PROCEDURE — 83036 HEMOGLOBIN GLYCOSYLATED A1C: CPT

## 2018-02-19 PROCEDURE — 97162 PT EVAL MOD COMPLEX 30 MIN: CPT

## 2018-02-19 RX ORDER — LEVOTHYROXINE SODIUM 125 MCG
1 TABLET ORAL
Qty: 0 | Refills: 0 | COMMUNITY
Start: 2018-02-19

## 2018-02-19 RX ORDER — FAMOTIDINE 10 MG/ML
1 INJECTION INTRAVENOUS
Qty: 0 | Refills: 0 | COMMUNITY
Start: 2018-02-19

## 2018-02-19 RX ORDER — LEVOTHYROXINE SODIUM 125 MCG
1 TABLET ORAL
Qty: 0 | Refills: 0 | COMMUNITY

## 2018-02-19 RX ORDER — LEVETIRACETAM 250 MG/1
1 TABLET, FILM COATED ORAL
Qty: 28 | Refills: 0 | OUTPATIENT
Start: 2018-02-19 | End: 2018-03-04

## 2018-02-19 RX ORDER — CITALOPRAM 10 MG/1
1 TABLET, FILM COATED ORAL
Qty: 0 | Refills: 0 | COMMUNITY
Start: 2018-02-19

## 2018-02-19 RX ORDER — CITALOPRAM 10 MG/1
1 TABLET, FILM COATED ORAL
Qty: 0 | Refills: 0 | COMMUNITY

## 2018-02-19 RX ORDER — DEXAMETHASONE 0.5 MG/5ML
2 ELIXIR ORAL
Qty: 20 | Refills: 0 | OUTPATIENT
Start: 2018-02-19

## 2018-02-19 RX ORDER — ACETAMINOPHEN 500 MG
2 TABLET ORAL
Qty: 0 | Refills: 0 | COMMUNITY
Start: 2018-02-19

## 2018-02-19 RX ADMIN — Medication 4 MILLIGRAM(S): at 11:08

## 2018-02-19 RX ADMIN — Medication 650 MILLIGRAM(S): at 03:00

## 2018-02-19 RX ADMIN — CITALOPRAM 20 MILLIGRAM(S): 10 TABLET, FILM COATED ORAL at 11:08

## 2018-02-19 RX ADMIN — FAMOTIDINE 20 MILLIGRAM(S): 10 INJECTION INTRAVENOUS at 11:08

## 2018-02-19 RX ADMIN — Medication 1 TABLET(S): at 12:28

## 2018-02-19 RX ADMIN — LEVETIRACETAM 500 MILLIGRAM(S): 250 TABLET, FILM COATED ORAL at 05:57

## 2018-02-19 RX ADMIN — Medication 650 MILLIGRAM(S): at 02:00

## 2018-02-19 RX ADMIN — Medication 650 MILLIGRAM(S): at 09:35

## 2018-02-19 RX ADMIN — Medication 100 MILLIGRAM(S): at 05:57

## 2018-02-19 RX ADMIN — Medication 4 MILLIGRAM(S): at 02:06

## 2018-02-19 RX ADMIN — Medication 650 MILLIGRAM(S): at 08:45

## 2018-02-19 RX ADMIN — Medication 100 MICROGRAM(S): at 05:57

## 2018-02-19 NOTE — DISCHARGE NOTE ADULT - PATIENT PORTAL LINK FT
You can access the [email or web address], offered by [facility], by registering with the following website: [email or web address]

## 2018-02-19 NOTE — DISCHARGE NOTE ADULT - CARE PROVIDERS DIRECT ADDRESSES
[email or web address]

## 2018-02-19 NOTE — PROGRESS NOTE ADULT - ASSESSMENT
63 y/o  female with PMHx of Hypothyroidism, obesity, depression, now presents with headaches for many years which was getting worse lately, diagnostic  imaging reveals a meningioma, seen & evaluated by neurosurgeon & now recommends a Right Frontal Craniotomy for tumor with sterotaxy on 2/15/18.     PROCEDURE: 2/15 s/p right craniotomy for Brain tumor excision     POD#4    PLAN:  Neuro:   -pain control- much improved, not requiring any IV dilaudid   - started decadron 4q8 will taper to off on discharge, given IV tylenol with good pain relief, not taking dilaudid or fioricet   - continue keppra for seizure prophylaxis  Respiratory:   - encouraged incentive spirometry  Endocrine:   - hypothyroid- continue synthroid      DVT ppx: venodynes, chemoprophylaxis  GI:  tolerating po diet  - bowel regimen  PT/OT:   - Home PT      [facility] [id number]

## 2018-02-19 NOTE — PROGRESS NOTE ADULT - SUBJECTIVE AND OBJECTIVE BOX
SUBJECTIVE: Pt seen and examined, headache much improved with decadron and IV tylenol    OVERNIGHT EVENTS: none    Vital Signs Last 24 Hrs  T(C): 36.8 (19 Feb 2018 05:00), Max: 37.1 (18 Feb 2018 11:00)  T(F): 98.2 (19 Feb 2018 05:00), Max: 98.8 (18 Feb 2018 11:00)  HR: 80 (19 Feb 2018 10:11) (56 - 91)  BP: 133/65 (19 Feb 2018 10:11) (110/73 - 133/65)  BP(mean): --  RR: 18 (19 Feb 2018 05:00) (18 - 18)  SpO2: 96% (19 Feb 2018 10:11) (92% - 99%)    PHYSICAL EXAM:    General: No Acute Distress     Neurological: Awake, alert oriented to person, place and time, Following Commands, PERRL, EOMI, Face Symmetrical, Speech Fluent, Moving all extremities, Muscle Strength normal in all four extremities, No Drift, Sensation to Light Touch Intact    Pulmonary: Clear to Auscultation, No Rales, No Rhonchi, No Wheezes     Cardiovascular: S1, S2, Regular Rate and Rhythm     Gastrointestinal: Soft, Nontender, Nondistended     Extremities: No calf tenderness     Incision: crani staples c/d/i    LABS:   02-17    142  |  104  |  11  ----------------------------<  90  4.0   |  26  |  0.62    Ca    8.8      17 Feb 2018 12:14      Hemoglobin A1C, Whole Blood: 5.2 % (02-19 @ 07:48)      02-18 @ 07:01  -  02-19 @ 07:00  --------------------------------------------------------  IN: 480 mL / OUT: 3 mL / NET: 477 mL      DRAINS: none    MEDICATIONS:  Antibiotics:    Neuro:  acetaminophen   Tablet. 650 milliGRAM(s) Oral every 6 hours PRN Mild Pain (1 - 3)  acetaminophen 325 mG/butalbital 50 mG/caffeine 40 mG 1 Tablet(s) Oral every 6 hours PRN headache  citalopram 20 milliGRAM(s) Oral daily  HYDROmorphone   Tablet 2 milliGRAM(s) Oral every 4 hours PRN Moderate Pain (4 - 6)  HYDROmorphone   Tablet 4 milliGRAM(s) Oral every 4 hours PRN Severe Pain (7 - 10)  HYDROmorphone  Injectable 0.5 milliGRAM(s) IV Push every 4 hours PRN breakthrough pain  levETIRAcetam 500 milliGRAM(s) Oral every 12 hours  ondansetron Injectable 4 milliGRAM(s) IV Push every 6 hours PRN Nausea and/or Vomiting    Cardiac:    Pulm:    GI/:  aluminum hydroxide/magnesium hydroxide/simethicone Suspension 30 milliLiter(s) Oral every 4 hours PRN Dyspepsia  docusate sodium 100 milliGRAM(s) Oral two times a day  famotidine    Tablet 20 milliGRAM(s) Oral daily  senna 1 Tablet(s) Oral daily    Other:   dexamethasone     Tablet 4 milliGRAM(s) Oral three times a day  dextrose 5%. 1000 milliLiter(s) IV Continuous <Continuous>  dextrose 50% Injectable 12.5 Gram(s) IV Push once  dextrose 50% Injectable 25 Gram(s) IV Push once  dextrose 50% Injectable 25 Gram(s) IV Push once  dextrose Gel 1 Dose(s) Oral once PRN Blood Glucose LESS THAN 70 milliGRAM(s)/deciliter  enoxaparin Injectable 40 milliGRAM(s) SubCutaneous at bedtime  glucagon  Injectable 1 milliGRAM(s) IntraMuscular once PRN Glucose LESS THAN 70 milligrams/deciliter  insulin lispro (HumaLOG) corrective regimen sliding scale   SubCutaneous three times a day before meals  insulin lispro (HumaLOG) corrective regimen sliding scale   SubCutaneous at bedtime  levothyroxine 100 MICROGram(s) Oral daily    DIET: [x] Regular [] CCD [] Renal [] Puree [] Dysphagia [] Tube Feeds:     IMAGING:

## 2018-02-19 NOTE — DISCHARGE NOTE ADULT - HOSPITAL COURSE
63 y/o  female with PMHx of Hypothyroidism, obesity, depression, now presents with headaches for many years which was getting worse lately, diagnostic  imaging reveals a meningioma, seen & evaluated by neurosurgeon & now recommends a Right Frontal Craniotomy for tumor with sterotaxy on 2/15/18.     On 2/15/18 pt went to the OR for right craniotomy for Brain tumor excision. Pt tolerated surgery without complications. Pt remained in the Neuro ICU until she was stable for transfer to the floor. Pt experienced severe headaches and was treated with fioricet, IV tylenol and decadron and headaches improved. CT head was stable and showed post op changes. Pt was evaluated by PT and was recommended for Home PT. Pt is medically and neurologically stable for discharge to home.

## 2018-02-19 NOTE — DISCHARGE NOTE ADULT - CARE PROVIDER_API CALL
Hari Newberry (MD), Neurological Surgery  [address] [place] [address]  Phone: [phone] (Clinical)  Fax: [phone]    Primary Care Physician,   Phone: (   )    -  Fax: (   )    -

## 2018-02-19 NOTE — DISCHARGE NOTE ADULT - PLAN OF CARE
2/15/18 s/p right craniotomy for meningioma resection You may shower and wash your hair with gentle shampoo. No rubbing or scrubbing of incision. Pat incision dry after shower. Keep incision clean and dry. Make appointment for follow up with Dr Newberry in 7-10 days for staple removal. stable Make appointment for follow up with Dr Newberry in 7-10 days for staple removal. continue synthroid. Make appointment for follow up with your Primary Care Physician in 1 week. 2/15/18 Right frontal craniotomy for meningioma resection Under treatment Fioricet and Decadron

## 2018-02-19 NOTE — DISCHARGE NOTE ADULT - CARE PLAN
Principal Discharge DX:	Meningioma  Goal:	2/15/18 s/p right craniotomy for meningioma resection  Assessment and plan of treatment:	You may shower and wash your hair with gentle shampoo. No rubbing or scrubbing of incision. Pat incision dry after shower. Keep incision clean and dry. Make appointment for follow up with Dr Newberry in 7-10 days for staple removal.  Secondary Diagnosis:	S/P craniotomy  Goal:	stable  Assessment and plan of treatment:	Make appointment for follow up with Dr Newberry in 7-10 days for staple removal.  Secondary Diagnosis:	Meniere disease  Goal:	stable  Secondary Diagnosis:	Hypothyroidism, unspecified type  Goal:	stable  Assessment and plan of treatment:	continue synthroid. Make appointment for follow up with your Primary Care Physician in 1 week. Principal Discharge DX:	Meningioma  Goal:	2/15/18 Right frontal craniotomy for meningioma resection  Assessment and plan of treatment:	You may shower and wash your hair with gentle shampoo. No rubbing or scrubbing of incision. Pat incision dry after shower. Keep incision clean and dry. Make appointment for follow up with Dr Newberry in 7-10 days for staple removal.  Secondary Diagnosis:	S/P craniotomy  Goal:	stable  Assessment and plan of treatment:	Make appointment for follow up with Dr Newberry in 7-10 days for staple removal.  Secondary Diagnosis:	Meniere disease  Goal:	stable  Secondary Diagnosis:	Hypothyroidism, unspecified type  Goal:	stable  Assessment and plan of treatment:	continue synthroid. Make appointment for follow up with your Primary Care Physician in 1 week.  Secondary Diagnosis:	Migraine  Goal:	Under treatment  Assessment and plan of treatment:	Fioricet and Decadron

## 2018-02-19 NOTE — DISCHARGE NOTE ADULT - ADDITIONAL INSTRUCTIONS
Any sign of fever, chills, nausea or vomiting, severe headache not relieved with pain medication, lethargy or change in mental status go to ER and contact Dr Newberry.

## 2018-02-19 NOTE — DISCHARGE NOTE ADULT - MEDICATION SUMMARY - MEDICATIONS TO TAKE
I will START or STAY ON the medications listed below when I get home from the hospital:    dexamethasone 2 mg oral tablet  -- 2 tab(s) by mouth 3 times a day x 1 day then 2 tabs 2x daily x 1 day then 1 tab 2x daily x 1 day then 1 tab daily x 1 day then stop  -- Indication: For Headache    butalbital/acetaminophen/caffeine 50 mg-325 mg-40 mg oral tablet  -- 1 tab(s) by mouth every 6 hours, As needed, headache  -- Indication: For Headache    acetaminophen 325 mg oral tablet  -- 2 tab(s) by mouth every 6 hours, As needed, Mild Pain (1 - 3)  -- Indication: For Mild pain    levETIRAcetam 500 mg oral tablet  -- 1 tab(s) by mouth every 12 hours  -- Indication: For Seizure prevention    citalopram 20 mg oral tablet  -- 1 tab(s) by mouth once a day  -- Indication: For Depression    famotidine 20 mg oral tablet  -- 1 tab(s) by mouth once a day  -- Indication: For Stomach protection while on decadron    levothyroxine 100 mcg (0.1 mg) oral tablet  -- 1 tab(s) by mouth once a day  -- Indication: For Hypothyroidism

## 2018-02-19 NOTE — DISCHARGE NOTE ADULT - NS AS DC STROKE ED MATERIALS
Call 911 for Stroke/Risk Factors for Stroke/Prescribed Medications/Stroke Education Booklet/Need for Followup After Discharge/Stroke Warning Signs and Symptoms

## 2018-02-19 NOTE — DISCHARGE NOTE ADULT - NS AS ACTIVITY OBS
Stairs allowed/Do not return to work until cleared by Dr eNwberry/Showering allowed/Walking-Indoors allowed/Walking-Outdoors allowed/No Heavy lifting/straining/Do not make important decisions/Do not drive or operate machinery

## 2018-02-21 LAB — SURGICAL PATHOLOGY STUDY: SIGNIFICANT CHANGE UP

## 2018-03-06 ENCOUNTER — APPOINTMENT (OUTPATIENT)
Dept: NEUROSURGERY | Facility: CLINIC | Age: 63
End: 2018-03-06
Payer: COMMERCIAL

## 2018-03-06 PROCEDURE — 99024 POSTOP FOLLOW-UP VISIT: CPT

## 2018-05-04 ENCOUNTER — APPOINTMENT (OUTPATIENT)
Dept: PAIN MANAGEMENT | Facility: CLINIC | Age: 63
End: 2018-05-04

## 2018-09-19 NOTE — PATIENT PROFILE ADULT. - PMH
oxygen Carpal tunnel syndrome    Depression    Hypothyroidism    Melanoma  Right LE  Meniere disease    Meningioma    Migraine  HA  Obesity

## 2018-11-14 ENCOUNTER — APPOINTMENT (OUTPATIENT)
Dept: ULTRASOUND IMAGING | Facility: CLINIC | Age: 63
End: 2018-11-14
Payer: COMMERCIAL

## 2018-11-14 ENCOUNTER — OUTPATIENT (OUTPATIENT)
Dept: OUTPATIENT SERVICES | Facility: HOSPITAL | Age: 63
LOS: 1 days | End: 2018-11-14
Payer: COMMERCIAL

## 2018-11-14 DIAGNOSIS — G56.01 CARPAL TUNNEL SYNDROME, RIGHT UPPER LIMB: Chronic | ICD-10-CM

## 2018-11-14 DIAGNOSIS — Z90.89 ACQUIRED ABSENCE OF OTHER ORGANS: Chronic | ICD-10-CM

## 2018-11-14 DIAGNOSIS — Z00.8 ENCOUNTER FOR OTHER GENERAL EXAMINATION: ICD-10-CM

## 2018-11-14 DIAGNOSIS — Z94.7 CORNEAL TRANSPLANT STATUS: Chronic | ICD-10-CM

## 2018-11-14 DIAGNOSIS — Z98.890 OTHER SPECIFIED POSTPROCEDURAL STATES: Chronic | ICD-10-CM

## 2018-11-14 PROBLEM — E03.9 HYPOTHYROIDISM, UNSPECIFIED: Chronic | Status: ACTIVE | Noted: 2018-02-08

## 2018-11-14 PROBLEM — E66.9 OBESITY, UNSPECIFIED: Chronic | Status: ACTIVE | Noted: 2018-02-08

## 2018-11-14 PROBLEM — D32.9 BENIGN NEOPLASM OF MENINGES, UNSPECIFIED: Chronic | Status: ACTIVE | Noted: 2018-02-08

## 2018-11-14 PROBLEM — G43.909 MIGRAINE, UNSPECIFIED, NOT INTRACTABLE, WITHOUT STATUS MIGRAINOSUS: Chronic | Status: ACTIVE | Noted: 2018-02-08

## 2018-11-14 PROBLEM — H81.09 MENIERE'S DISEASE, UNSPECIFIED EAR: Chronic | Status: ACTIVE | Noted: 2018-02-08

## 2018-11-14 PROBLEM — G56.00 CARPAL TUNNEL SYNDROME, UNSPECIFIED UPPER LIMB: Chronic | Status: ACTIVE | Noted: 2018-02-08

## 2018-11-14 PROBLEM — C43.9 MALIGNANT MELANOMA OF SKIN, UNSPECIFIED: Chronic | Status: ACTIVE | Noted: 2018-02-08

## 2018-11-14 PROBLEM — F32.9 MAJOR DEPRESSIVE DISORDER, SINGLE EPISODE, UNSPECIFIED: Chronic | Status: ACTIVE | Noted: 2018-02-08

## 2018-11-14 PROCEDURE — 76700 US EXAM ABDOM COMPLETE: CPT | Mod: 26

## 2018-11-14 PROCEDURE — 76700 US EXAM ABDOM COMPLETE: CPT

## 2018-11-30 ENCOUNTER — APPOINTMENT (OUTPATIENT)
Dept: MAMMOGRAPHY | Facility: CLINIC | Age: 63
End: 2018-11-30
Payer: COMMERCIAL

## 2018-11-30 ENCOUNTER — OUTPATIENT (OUTPATIENT)
Dept: OUTPATIENT SERVICES | Facility: HOSPITAL | Age: 63
LOS: 1 days | End: 2018-11-30
Payer: COMMERCIAL

## 2018-11-30 DIAGNOSIS — Z90.89 ACQUIRED ABSENCE OF OTHER ORGANS: Chronic | ICD-10-CM

## 2018-11-30 DIAGNOSIS — Z00.8 ENCOUNTER FOR OTHER GENERAL EXAMINATION: ICD-10-CM

## 2018-11-30 DIAGNOSIS — Z94.7 CORNEAL TRANSPLANT STATUS: Chronic | ICD-10-CM

## 2018-11-30 DIAGNOSIS — G56.01 CARPAL TUNNEL SYNDROME, RIGHT UPPER LIMB: Chronic | ICD-10-CM

## 2018-11-30 DIAGNOSIS — Z98.890 OTHER SPECIFIED POSTPROCEDURAL STATES: Chronic | ICD-10-CM

## 2018-11-30 PROCEDURE — 77067 SCR MAMMO BI INCL CAD: CPT | Mod: 26

## 2018-11-30 PROCEDURE — 77063 BREAST TOMOSYNTHESIS BI: CPT

## 2018-11-30 PROCEDURE — 77063 BREAST TOMOSYNTHESIS BI: CPT | Mod: 26

## 2018-11-30 PROCEDURE — 77067 SCR MAMMO BI INCL CAD: CPT

## 2018-12-06 ENCOUNTER — APPOINTMENT (OUTPATIENT)
Dept: MRI IMAGING | Facility: CLINIC | Age: 63
End: 2018-12-06

## 2019-05-01 ENCOUNTER — TRANSCRIPTION ENCOUNTER (OUTPATIENT)
Age: 64
End: 2019-05-01

## 2019-05-07 ENCOUNTER — APPOINTMENT (OUTPATIENT)
Dept: NEUROSURGERY | Facility: CLINIC | Age: 64
End: 2019-05-07
Payer: COMMERCIAL

## 2019-05-07 VITALS
SYSTOLIC BLOOD PRESSURE: 142 MMHG | RESPIRATION RATE: 17 BRPM | HEART RATE: 69 BPM | TEMPERATURE: 97.7 F | BODY MASS INDEX: 33.13 KG/M2 | HEIGHT: 62 IN | OXYGEN SATURATION: 93 % | WEIGHT: 180 LBS | DIASTOLIC BLOOD PRESSURE: 80 MMHG

## 2019-05-07 DIAGNOSIS — D32.9 BENIGN NEOPLASM OF MENINGES, UNSPECIFIED: ICD-10-CM

## 2019-05-07 DIAGNOSIS — Z78.9 OTHER SPECIFIED HEALTH STATUS: ICD-10-CM

## 2019-05-07 PROCEDURE — 99213 OFFICE O/P EST LOW 20 MIN: CPT

## 2019-05-07 RX ORDER — ESOMEPRAZOLE MAGNESIUM 40 MG/1
40 GRANULE, DELAYED RELEASE ORAL
Refills: 0 | Status: ACTIVE | COMMUNITY

## 2019-05-07 RX ORDER — RANITIDINE HYDROCHLORIDE 150 MG/1
150 TABLET, FILM COATED ORAL
Refills: 0 | Status: ACTIVE | COMMUNITY

## 2019-05-07 RX ORDER — IBUPROFEN 800 MG
TABLET ORAL
Refills: 0 | Status: DISCONTINUED | COMMUNITY
End: 2019-05-07

## 2019-05-07 NOTE — ASSESSMENT
[FreeTextEntry1] : IMPRESSION:\par \par 1. 15 months s/p right frontal craniotomy and gross total excision of a meningioma Gr 1.\par 2. Headaches resolved\par 3. No neurological symptoms\par 4. No post-op MRI yet\par \par PLAN;\par 1. MRI brain w/wo contrast.\par 2. F/U after MRI brain.\par \par Hari Newberry MD, FACS, FAANS\par Professor and \par Department of Neurosurgery\par [facility]\par [address], [address]\[place] [address]\par [phone] Clinical\par [phone] Academic\par

## 2019-05-07 NOTE — DATA REVIEWED
[de-identified] :  EXAM: CT BRAIN    PROCEDURE DATE: 02/16/2018      INTERPRETATION: CLINICAL INDICATION: Postoperative right craniotomy for  meningioma.   TECHNIQUE : Axial CT scanning of the brain was obtained from the skull base  to the vertex without the administration of intravenous contrast. Coronal  and sagittal reformatted images were subsequently obtained.   COMPARISON: MRI brain 2/12/2018   FINDINGS:   Interval removal of a right frontal meningioma. Right frontal craniotomy  changes with overlying scalp swelling and subjacent expected postoperative  changes including extra-axial fluid, hematoma, and air measuring a maximal  thickness of 1.6 cm exerting localized mass effect on the frontal lobe. No  midline shift. Surgical drain within the scalp.   There is no CT evidence of acute transcortical infarct.   No hydrocephalus. Basal cisterns are patent.   The visualized paranasal sinuses and mastoid air cells are clear.   IMPRESSION:   Interval right frontal postoperative changes as detailed above.           JOEL BILL M.D., ATTENDING RADIOLOGIST  This document has been electronically signed. Feb 16 2018 10:16AM    [de-identified] : Pre-op only.

## 2019-05-07 NOTE — PHYSICAL EXAM
[General Appearance - Alert] : alert [General Appearance - In No Acute Distress] : in no acute distress [General Appearance - Well Nourished] : well nourished [General Appearance - Well Developed] : well developed [General Appearance - Well-Appearing] : healthy appearing [Person] : oriented to person [Time] : oriented to time [Place] : oriented to place [Cranial Nerves Optic (II)] : visual acuity intact bilaterally,  pupils equal round and reactive to light [Cranial Nerves Oculomotor (III)] : extraocular motion intact [Cranial Nerves Trigeminal (V)] : facial sensation intact symmetrically [Cranial Nerves Facial (VII)] : face symmetrical [Cranial Nerves Vestibulocochlear (VIII)] : hearing was intact bilaterally [Cranial Nerves Accessory (XI - Cranial And Spinal)] : head turning and shoulder shrug symmetric [Cranial Nerves Glossopharyngeal (IX)] : tongue and palate midline [Cranial Nerves Hypoglossal (XII)] : there was no tongue deviation with protrusion [Motor Strength] : muscle strength was normal in all four extremities [Motor Handedness Right-Handed] : the patient is right hand dominant [Sclera] : the sclera and conjunctiva were normal [Balance] : balance was intact [PERRL With Normal Accommodation] : pupils were equal in size, round, reactive to light, with normal accommodation [Extraocular Movements] : extraocular movements were intact [Outer Ear] : the ears and nose were normal in appearance [Hearing Threshold Finger Rub Not [place]] : hearing was normal [Neck Appearance] : the appearance of the neck was normal [Oropharynx] : the oropharynx was normal [Exaggerated Use Of Accessory Muscles For Inspiration] : no accessory muscle use [Abnormal Walk] : normal gait [Involuntary Movements] : no involuntary movements were seen [Motor Tone] : muscle strength and tone were normal [Skin Color & Pigmentation] : normal skin color and pigmentation [] : no rash [Well-Healed] : well-healed [FreeTextEntry1] : Right frontal

## 2019-05-07 NOTE — REASON FOR VISIT
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 \par \par She presents to our office today generally doing very well and without any headaches.  States that she had a recent hospitalization at [facility] with c/o chest pain but work up was negative.  She also reports that she had a stomach ulcer which is healed now.  She denies weakness to extremities, difficulty with speech and other sensory/motor deficits.\par \par \par \par

## 2019-05-31 ENCOUNTER — OUTPATIENT (OUTPATIENT)
Dept: OUTPATIENT SERVICES | Facility: HOSPITAL | Age: 64
LOS: 1 days | End: 2019-05-31
Payer: COMMERCIAL

## 2019-05-31 ENCOUNTER — APPOINTMENT (OUTPATIENT)
Dept: MRI IMAGING | Facility: CLINIC | Age: 64
End: 2019-05-31
Payer: COMMERCIAL

## 2019-05-31 DIAGNOSIS — G56.01 CARPAL TUNNEL SYNDROME, RIGHT UPPER LIMB: Chronic | ICD-10-CM

## 2019-05-31 DIAGNOSIS — Z90.89 ACQUIRED ABSENCE OF OTHER ORGANS: Chronic | ICD-10-CM

## 2019-05-31 DIAGNOSIS — Z98.890 OTHER SPECIFIED POSTPROCEDURAL STATES: Chronic | ICD-10-CM

## 2019-05-31 DIAGNOSIS — Z94.7 CORNEAL TRANSPLANT STATUS: Chronic | ICD-10-CM

## 2019-05-31 DIAGNOSIS — Z00.8 ENCOUNTER FOR OTHER GENERAL EXAMINATION: ICD-10-CM

## 2019-05-31 PROCEDURE — 70553 MRI BRAIN STEM W/O & W/DYE: CPT | Mod: 26

## 2019-05-31 PROCEDURE — 70553 MRI BRAIN STEM W/O & W/DYE: CPT

## 2019-09-09 NOTE — OCCUPATIONAL THERAPY INITIAL EVALUATION ADULT - REHAB POTENTIAL, OT EVAL
Protocol For Uva1: The patient received UVA1. Protocol For Photochemotherapy: Petrolatum And Nbuvb: The patient received Photochemotherapy: Petrolatum and NBUVB (petrolatum applied to all lesions prior to phototherapy). Protocol For Broad Band Uvb: The patient received Broad Band UVB. Protocol For Nbuvb: The patient received NBUVB. Protocol For Puva: The patient received PUVA. Treatment Number: 60 Changes In Treatment Protocol: Hold at current dose. Comments On Previous Treatment: Previous treatment caused some erythema to both axilla. Patient requests to stay at current dose age. She reports trying to lift both arms the last 5 minutes of treatment. Advised use of PPE and sunscreen. Protocol: NBUVB Total Treatment Time: 13:11 Skin Type: III Protocol For Photochemotherapy For Severe Photoresponsive Dermatoses: Tar And Nbuvb (Goeckerman Treatment): The patient received Photochemotherapy for severe photoresponsive dermatoses: Tar and NBUVB (Goeckerman treatment) requiring at least 4 to 8 hours of care under direct physician supervision. Detail Level: Generalized Protocol For Photochemotherapy: Triamcinolone Ointment And Nbuvb: The patient received Photochemotherapy: Triamcinolone and NBUVB (triamcinolone ointment applied to all lesions prior to phototherapy). Protocol For Photochemotherapy: Baby Oil And Nbuvb: The patient received Photochemotherapy: Baby Oil and NBUVB (baby oil applied to all lesions prior to phototherapy). Protocol For Photochemotherapy For Severe Photoresponsive Dermatoses: Puva: The patient received Photochemotherapy for severe photoresponsive dermatoses: PUVA requiring at least 4 to 8 hours of care under direct physician supervision. Protocol For Photochemotherapy: Mineral Oil And Nbuvb: The patient received Photochemotherapy: Mineral Oil and NBUVB (mineral oil applied to all lesions prior to phototherapy). Consent: Written consent obtained.  The risks were reviewed with the patient including but not limited to: burn, pigmentary changes, pain, blistering, scabbing, redness, increased risk of skin cancers, and the remote possibility of scarring. Protocol For Nb Uva: The patient received NB UVA. Protocol For Protocol For Photochemotherapy For Severe Photoresponsive Dermatoses: Bath Puva: The patient received Photochemotherapy for severe photoresponsive dermatoses: Bath PUVA requiring at least 4 to 8 hours of care under direct physician supervision. Render Post-Care In The Note: no Protocol For Photochemotherapy: Tar And Nbuvb (Goeckerman Treatment): The patient received Photochemotherapy: Tar and NBUVB (Goeckerman treatment). Name Of Supervising Technician: Harrison Protocol For Bath Puva: The patient received Bath PUVA. Total Body Time: 13:03 Protocol For Photochemotherapy For Severe Photoresponsive Dermatoses: Petrolatum And Broad Band Uvb: The patient received Photochemotherapyfor severe photoresponsive dermatoses: Petrolatum and Broad Band UVB requiring at least 4 to 8 hours of care under direct physician supervision. Protocol For Photochemotherapy For Severe Photoresponsive Dermatoses: Petrolatum And Nbuvb: The patient received Photochemotherapy for severe photoresponsive dermatoses: Petrolatum and NBUVB requiring at least 4 to 8 hours of care under direct physician supervision. Protocol For Photochemotherapy: Tar And Broad Band Uvb (Goeckerman Treatment): The patient received Photochemotherapy: Tar and Broad Band UVB (Goeckerman treatment). Protocol For Photochemotherapy: Mineral Oil And Broad Band Uvb: The patient received Photochemotherapy: Mineral Oil and Broad Band UVB. Total Body Energy: 2272 Protocol For Photochemotherapy For Severe Photoresponsive Dermatoses: Tar And Broad Band Uvb (Goeckerman Treatment): The patient received Photochemotherapy for severe photoresponsive dermatoses: Tar and Broad Band UVB (Goeckerman treatment) requiring at least 4 to 8 hours of care under direct physician supervision. Post-Care Instructions: I reviewed with the patient in detail post-care instructions. Patient is to wear sun protection. Patients may expect sunburn like redness, discomfort and scabbing. good, to achieve stated therapy goals Protocol For Uva: The patient received UVA. Protocol For Photochemotherapy: Petrolatum And Broad Band Uvb: The patient received Photochemotherapy: Petrolatum and Broad Band UVB.

## 2019-12-20 ENCOUNTER — APPOINTMENT (OUTPATIENT)
Dept: MAMMOGRAPHY | Facility: CLINIC | Age: 64
End: 2019-12-20
Payer: COMMERCIAL

## 2019-12-20 ENCOUNTER — OUTPATIENT (OUTPATIENT)
Dept: OUTPATIENT SERVICES | Facility: HOSPITAL | Age: 64
LOS: 1 days | End: 2019-12-20
Payer: COMMERCIAL

## 2019-12-20 DIAGNOSIS — Z00.8 ENCOUNTER FOR OTHER GENERAL EXAMINATION: ICD-10-CM

## 2019-12-20 DIAGNOSIS — Z94.7 CORNEAL TRANSPLANT STATUS: Chronic | ICD-10-CM

## 2019-12-20 DIAGNOSIS — Z98.890 OTHER SPECIFIED POSTPROCEDURAL STATES: Chronic | ICD-10-CM

## 2019-12-20 DIAGNOSIS — G56.01 CARPAL TUNNEL SYNDROME, RIGHT UPPER LIMB: Chronic | ICD-10-CM

## 2019-12-20 DIAGNOSIS — Z90.89 ACQUIRED ABSENCE OF OTHER ORGANS: Chronic | ICD-10-CM

## 2019-12-20 PROCEDURE — 77067 SCR MAMMO BI INCL CAD: CPT | Mod: 26

## 2019-12-20 PROCEDURE — 77063 BREAST TOMOSYNTHESIS BI: CPT

## 2019-12-20 PROCEDURE — 77063 BREAST TOMOSYNTHESIS BI: CPT | Mod: 26

## 2019-12-20 PROCEDURE — 77067 SCR MAMMO BI INCL CAD: CPT

## 2020-05-29 ENCOUNTER — TRANSCRIPTION ENCOUNTER (OUTPATIENT)
Age: 65
End: 2020-05-29

## 2020-09-04 ENCOUNTER — TRANSCRIPTION ENCOUNTER (OUTPATIENT)
Age: 65
End: 2020-09-04

## 2020-12-15 ENCOUNTER — OUTPATIENT (OUTPATIENT)
Dept: OUTPATIENT SERVICES | Facility: HOSPITAL | Age: 65
LOS: 1 days | End: 2020-12-15
Payer: COMMERCIAL

## 2020-12-15 ENCOUNTER — APPOINTMENT (OUTPATIENT)
Dept: MAMMOGRAPHY | Facility: CLINIC | Age: 65
End: 2020-12-15
Payer: COMMERCIAL

## 2020-12-15 DIAGNOSIS — Z00.8 ENCOUNTER FOR OTHER GENERAL EXAMINATION: ICD-10-CM

## 2020-12-15 DIAGNOSIS — Z94.7 CORNEAL TRANSPLANT STATUS: Chronic | ICD-10-CM

## 2020-12-15 DIAGNOSIS — Z98.890 OTHER SPECIFIED POSTPROCEDURAL STATES: Chronic | ICD-10-CM

## 2020-12-15 DIAGNOSIS — Z90.89 ACQUIRED ABSENCE OF OTHER ORGANS: Chronic | ICD-10-CM

## 2020-12-15 DIAGNOSIS — G56.01 CARPAL TUNNEL SYNDROME, RIGHT UPPER LIMB: Chronic | ICD-10-CM

## 2020-12-15 PROCEDURE — 77063 BREAST TOMOSYNTHESIS BI: CPT | Mod: 26

## 2020-12-15 PROCEDURE — 77063 BREAST TOMOSYNTHESIS BI: CPT

## 2020-12-15 PROCEDURE — 77067 SCR MAMMO BI INCL CAD: CPT

## 2020-12-15 PROCEDURE — 77067 SCR MAMMO BI INCL CAD: CPT | Mod: 26

## 2020-12-28 ENCOUNTER — OUTPATIENT (OUTPATIENT)
Dept: OUTPATIENT SERVICES | Facility: HOSPITAL | Age: 65
LOS: 1 days | End: 2020-12-28
Payer: COMMERCIAL

## 2020-12-28 ENCOUNTER — APPOINTMENT (OUTPATIENT)
Dept: RADIOLOGY | Facility: CLINIC | Age: 65
End: 2020-12-28
Payer: COMMERCIAL

## 2020-12-28 DIAGNOSIS — G56.01 CARPAL TUNNEL SYNDROME, RIGHT UPPER LIMB: Chronic | ICD-10-CM

## 2020-12-28 DIAGNOSIS — Z00.8 ENCOUNTER FOR OTHER GENERAL EXAMINATION: ICD-10-CM

## 2020-12-28 DIAGNOSIS — Z98.890 OTHER SPECIFIED POSTPROCEDURAL STATES: Chronic | ICD-10-CM

## 2020-12-28 DIAGNOSIS — Z90.89 ACQUIRED ABSENCE OF OTHER ORGANS: Chronic | ICD-10-CM

## 2020-12-28 DIAGNOSIS — Z94.7 CORNEAL TRANSPLANT STATUS: Chronic | ICD-10-CM

## 2020-12-28 PROCEDURE — 77080 DXA BONE DENSITY AXIAL: CPT | Mod: 26

## 2020-12-28 PROCEDURE — 77080 DXA BONE DENSITY AXIAL: CPT

## 2021-03-07 ENCOUNTER — TRANSCRIPTION ENCOUNTER (OUTPATIENT)
Age: 66
End: 2021-03-07

## 2021-05-05 ENCOUNTER — APPOINTMENT (OUTPATIENT)
Dept: NEUROSURGERY | Facility: CLINIC | Age: 66
End: 2021-05-05

## 2021-05-05 ENCOUNTER — TRANSCRIPTION ENCOUNTER (OUTPATIENT)
Age: 66
End: 2021-05-05

## 2021-05-17 ENCOUNTER — TRANSCRIPTION ENCOUNTER (OUTPATIENT)
Age: 66
End: 2021-05-17

## 2021-05-24 ENCOUNTER — APPOINTMENT (OUTPATIENT)
Dept: MRI IMAGING | Facility: CLINIC | Age: 66
End: 2021-05-24

## 2021-07-15 ENCOUNTER — APPOINTMENT (OUTPATIENT)
Dept: MRI IMAGING | Facility: CLINIC | Age: 66
End: 2021-07-15
Payer: COMMERCIAL

## 2021-07-15 ENCOUNTER — OUTPATIENT (OUTPATIENT)
Dept: OUTPATIENT SERVICES | Facility: HOSPITAL | Age: 66
LOS: 1 days | End: 2021-07-15
Payer: COMMERCIAL

## 2021-07-15 DIAGNOSIS — Z94.7 CORNEAL TRANSPLANT STATUS: Chronic | ICD-10-CM

## 2021-07-15 DIAGNOSIS — Z98.890 OTHER SPECIFIED POSTPROCEDURAL STATES: Chronic | ICD-10-CM

## 2021-07-15 DIAGNOSIS — D32.9 BENIGN NEOPLASM OF MENINGES, UNSPECIFIED: ICD-10-CM

## 2021-07-15 DIAGNOSIS — G56.01 CARPAL TUNNEL SYNDROME, RIGHT UPPER LIMB: Chronic | ICD-10-CM

## 2021-07-15 DIAGNOSIS — Z90.89 ACQUIRED ABSENCE OF OTHER ORGANS: Chronic | ICD-10-CM

## 2021-07-15 PROCEDURE — A9585: CPT

## 2021-07-15 PROCEDURE — 70553 MRI BRAIN STEM W/O & W/DYE: CPT

## 2021-07-15 PROCEDURE — 70553 MRI BRAIN STEM W/O & W/DYE: CPT | Mod: 26

## 2021-08-05 ENCOUNTER — NON-APPOINTMENT (OUTPATIENT)
Age: 66
End: 2021-08-05

## 2021-10-11 ENCOUNTER — TRANSCRIPTION ENCOUNTER (OUTPATIENT)
Age: 66
End: 2021-10-11

## 2022-01-21 ENCOUNTER — TRANSCRIPTION ENCOUNTER (OUTPATIENT)
Age: 67
End: 2022-01-21

## 2022-06-26 ENCOUNTER — EMERGENCY (EMERGENCY)
Facility: HOSPITAL | Age: 67
LOS: 0 days | Discharge: HOME | End: 2022-06-27
Attending: EMERGENCY MEDICINE
Payer: COMMERCIAL

## 2022-06-26 VITALS — WEIGHT: 175.05 LBS | HEIGHT: 62 IN

## 2022-06-26 DIAGNOSIS — Z98.890 OTHER SPECIFIED POSTPROCEDURAL STATES: Chronic | ICD-10-CM

## 2022-06-26 DIAGNOSIS — E03.9 HYPOTHYROIDISM, UNSPECIFIED: ICD-10-CM

## 2022-06-26 DIAGNOSIS — H81.09 MENIERE'S DISEASE, UNSPECIFIED EAR: ICD-10-CM

## 2022-06-26 DIAGNOSIS — K20.90 ESOPHAGITIS, UNSPECIFIED WITHOUT BLEEDING: ICD-10-CM

## 2022-06-26 DIAGNOSIS — Z85.820 PERSONAL HISTORY OF MALIGNANT MELANOMA OF SKIN: ICD-10-CM

## 2022-06-26 DIAGNOSIS — Z90.09 ACQUIRED ABSENCE OF OTHER PART OF HEAD AND NECK: ICD-10-CM

## 2022-06-26 DIAGNOSIS — G56.01 CARPAL TUNNEL SYNDROME, RIGHT UPPER LIMB: Chronic | ICD-10-CM

## 2022-06-26 DIAGNOSIS — Z90.89 ACQUIRED ABSENCE OF OTHER ORGANS: Chronic | ICD-10-CM

## 2022-06-26 DIAGNOSIS — Z88.0 ALLERGY STATUS TO PENICILLIN: ICD-10-CM

## 2022-06-26 DIAGNOSIS — R10.13 EPIGASTRIC PAIN: ICD-10-CM

## 2022-06-26 DIAGNOSIS — F32.A DEPRESSION, UNSPECIFIED: ICD-10-CM

## 2022-06-26 DIAGNOSIS — Z94.7 CORNEAL TRANSPLANT STATUS: Chronic | ICD-10-CM

## 2022-06-26 DIAGNOSIS — D32.9 BENIGN NEOPLASM OF MENINGES, UNSPECIFIED: ICD-10-CM

## 2022-06-26 DIAGNOSIS — Z88.2 ALLERGY STATUS TO SULFONAMIDES: ICD-10-CM

## 2022-06-26 DIAGNOSIS — Z86.39 PERSONAL HISTORY OF OTHER ENDOCRINE, NUTRITIONAL AND METABOLIC DISEASE: ICD-10-CM

## 2022-06-26 DIAGNOSIS — G43.009 MIGRAINE WITHOUT AURA, NOT INTRACTABLE, WITHOUT STATUS MIGRAINOSUS: ICD-10-CM

## 2022-06-26 LAB
ALBUMIN SERPL ELPH-MCNC: 3.7 G/DL — SIGNIFICANT CHANGE UP (ref 3.3–5)
ALP SERPL-CCNC: 74 U/L — SIGNIFICANT CHANGE UP (ref 40–120)
ALT FLD-CCNC: 35 U/L — SIGNIFICANT CHANGE UP (ref 12–78)
AST SERPL-CCNC: 20 U/L — SIGNIFICANT CHANGE UP (ref 15–37)
BASOPHILS # BLD AUTO: 0.05 K/UL — SIGNIFICANT CHANGE UP (ref 0–0.2)
BASOPHILS NFR BLD AUTO: 0.8 % — SIGNIFICANT CHANGE UP (ref 0–2)
BILIRUB SERPL-MCNC: 0.5 MG/DL — SIGNIFICANT CHANGE UP (ref 0.2–1.2)
BUN SERPL-MCNC: 15 MG/DL — SIGNIFICANT CHANGE UP (ref 7–23)
CALCIUM SERPL-MCNC: 8.4 MG/DL — LOW (ref 8.5–10.1)
CHLORIDE SERPL-SCNC: 107 MMOL/L — SIGNIFICANT CHANGE UP (ref 96–108)
CO2 SERPL-SCNC: 28 MMOL/L — SIGNIFICANT CHANGE UP (ref 22–31)
CREAT SERPL-MCNC: 0.58 MG/DL — SIGNIFICANT CHANGE UP (ref 0.5–1.3)
EGFR: 101 ML/MIN/1.73M2 — SIGNIFICANT CHANGE UP
EOSINOPHIL # BLD AUTO: 0.09 K/UL — SIGNIFICANT CHANGE UP (ref 0–0.5)
EOSINOPHIL NFR BLD AUTO: 1.4 % — SIGNIFICANT CHANGE UP (ref 0–6)
GLUCOSE SERPL-MCNC: 130 MG/DL — HIGH (ref 70–99)
HCT VFR BLD CALC: 39 % — SIGNIFICANT CHANGE UP (ref 34.5–45)
HGB BLD-MCNC: 12.9 G/DL — SIGNIFICANT CHANGE UP (ref 11.5–15.5)
IMM GRANULOCYTES NFR BLD AUTO: 0.2 % — SIGNIFICANT CHANGE UP (ref 0–1.5)
LYMPHOCYTES # BLD AUTO: 1.7 K/UL — SIGNIFICANT CHANGE UP (ref 1–3.3)
LYMPHOCYTES # BLD AUTO: 25.6 % — SIGNIFICANT CHANGE UP (ref 13–44)
MCHC RBC-ENTMCNC: 31 PG — SIGNIFICANT CHANGE UP (ref 27–34)
MCHC RBC-ENTMCNC: 33.1 GM/DL — SIGNIFICANT CHANGE UP (ref 32–36)
MCV RBC AUTO: 93.8 FL — SIGNIFICANT CHANGE UP (ref 80–100)
MONOCYTES # BLD AUTO: 0.42 K/UL — SIGNIFICANT CHANGE UP (ref 0–0.9)
MONOCYTES NFR BLD AUTO: 6.3 % — SIGNIFICANT CHANGE UP (ref 2–14)
NEUTROPHILS # BLD AUTO: 4.37 K/UL — SIGNIFICANT CHANGE UP (ref 1.8–7.4)
NEUTROPHILS NFR BLD AUTO: 65.7 % — SIGNIFICANT CHANGE UP (ref 43–77)
PLATELET # BLD AUTO: 236 K/UL — SIGNIFICANT CHANGE UP (ref 150–400)
POTASSIUM SERPL-MCNC: 4.1 MMOL/L — SIGNIFICANT CHANGE UP (ref 3.5–5.3)
POTASSIUM SERPL-SCNC: 4.1 MMOL/L — SIGNIFICANT CHANGE UP (ref 3.5–5.3)
PROT SERPL-MCNC: 7 GM/DL — SIGNIFICANT CHANGE UP (ref 6–8.3)
RBC # BLD: 4.16 M/UL — SIGNIFICANT CHANGE UP (ref 3.8–5.2)
RBC # FLD: 12.1 % — SIGNIFICANT CHANGE UP (ref 10.3–14.5)
SODIUM SERPL-SCNC: 134 MMOL/L — LOW (ref 135–145)
TROPONIN I, HIGH SENSITIVITY RESULT: 5.99 NG/L — SIGNIFICANT CHANGE UP
WBC # BLD: 6.64 K/UL — SIGNIFICANT CHANGE UP (ref 3.8–10.5)
WBC # FLD AUTO: 6.64 K/UL — SIGNIFICANT CHANGE UP (ref 3.8–10.5)

## 2022-06-26 PROCEDURE — 84484 ASSAY OF TROPONIN QUANT: CPT

## 2022-06-26 PROCEDURE — 93005 ELECTROCARDIOGRAM TRACING: CPT

## 2022-06-26 PROCEDURE — 85025 COMPLETE CBC W/AUTO DIFF WBC: CPT

## 2022-06-26 PROCEDURE — 99285 EMERGENCY DEPT VISIT HI MDM: CPT | Mod: 25

## 2022-06-26 PROCEDURE — 93010 ELECTROCARDIOGRAM REPORT: CPT

## 2022-06-26 PROCEDURE — 71046 X-RAY EXAM CHEST 2 VIEWS: CPT

## 2022-06-26 PROCEDURE — 71046 X-RAY EXAM CHEST 2 VIEWS: CPT | Mod: 26

## 2022-06-26 PROCEDURE — 96374 THER/PROPH/DIAG INJ IV PUSH: CPT

## 2022-06-26 PROCEDURE — 36415 COLL VENOUS BLD VENIPUNCTURE: CPT

## 2022-06-26 PROCEDURE — 99285 EMERGENCY DEPT VISIT HI MDM: CPT

## 2022-06-26 PROCEDURE — 80053 COMPREHEN METABOLIC PANEL: CPT

## 2022-06-26 RX ADMIN — Medication 30 MILLILITER(S): at 22:27

## 2022-06-26 NOTE — ED STATDOCS - WET READ LAUNCH FT
Spoke with wife on Friday evening. She is agreeable to [facility] since [facility] acute rehab had not yet accepted. Mallika to start precert today. There are no Wet Read(s) to document.

## 2022-06-26 NOTE — ED STATDOCS - NSICDXPASTMEDICALHX_GEN_ALL_CORE_FT
PAST MEDICAL HISTORY:  Carpal tunnel syndrome     Depression     Hypothyroidism     Melanoma Right LE    Meniere disease     Meningioma     Migraine HA    Obesity

## 2022-06-26 NOTE — ED STATDOCS - CLINICAL SUMMARY MEDICAL DECISION MAKING FREE TEXT BOX
pt with sscp s/p swallowing tylenol tonight, has drank copious amounts of water, gave herself the heimlich still has pain so she came to the er will give maalox, get ekg and cardiac enzymes

## 2022-06-26 NOTE — ED STATDOCS - OBJECTIVE STATEMENT
65 yo female pw sscp s/p swallowing 2 tylenol tonight at 7pm.  no dypsnea or sob, no drooling, voice normal

## 2022-06-26 NOTE — ED STATDOCS - NSICDXPASTSURGICALHX_GEN_ALL_CORE_FT
PAST SURGICAL HISTORY:  Carpal tunnel syndrome of right wrist      delivery delivered     Corneal transplant status left    H/O melanoma excision in right Lower ext    S/P T&A (status post tonsillectomy and adenoidectomy)

## 2022-06-26 NOTE — ED STATDOCS - PATIENT PORTAL LINK FT
You can access the FollowMyHealth Patient Portal offered by [facility] by registering at the following website: [email or web address]. By joining [employer]’s FollowMyHealth portal, you will also be able to view your health information using other applications (apps) compatible with our system.

## 2022-06-26 NOTE — ED STATDOCS - INTERNATIONAL TRAVEL
78F.  hx dementia.  presented to ED 06/25/2021.  patient lives with her  (primary care giver).  he was admitted to .  afterward, patient was being watched by her daughter.  it appeared to her that she was more forgetful and confused then usual.  she also was brought to the ED for evaluation.  in ED, FS >400mg/dL.  UA (+) WBCs.  UCx and BCx x 2 sets obtained in ED.  given ceftriaxone, IVFs and 6 units of insulin.    PMHx:  LBBB;  CVA;  DM;  breast CA; dementia.    UTI.  - UCx:  pending.  - BCx:  pending.  - ceftriaxone (d2).    hx DM.  - uncontrolled.  - A1C 13.4%.  - consistent CHO diet.  - add Lantus 10units sq qhs.  - correction insulin coverage.    hx CVA.  - lipid panel.  - notably patient is not rx'd AP or ST.  - add ASA 81mg po qd and atorvastatin 20m po qhs.    debility.  - PT.    DVT prophylaxis.  - UFH sq.    disposition.  - general medical [facility].  - CM, re:  DC planning and placement.    communication.  - ED RN. No

## 2022-06-26 NOTE — ED STATDOCS - NSFOLLOWUPINSTRUCTIONS_ED_ALL_ED_FT
Esophagitis       Esophagitis is inflammation of the esophagus. The esophagus is the tube that carries food from the mouth to the stomach. Esophagitis can cause soreness or pain in the esophagus. This condition can make it difficult and painful to swallow.      What are the causes?    Most causes of esophagitis are not serious. Common causes of this condition include:  •Gastroesophageal reflux disease (GERD). This is when stomach contents move back up into the esophagus (reflux).      •Repeated vomiting.      •An allergic reaction, especially caused by food allergies (eosinophilic esophagitis).      •Injury to the esophagus by swallowing large pills with or without water, or swallowing certain types of medicines.      •Swallowing harmful chemicals, such as household cleaning products.      •Drinking a lot of alcohol.      •An infection of the esophagus. This most often occurs in people who have a weakened immune system.      •Radiation or chemotherapy treatment for cancer.      •Certain diseases such as sarcoidosis, Crohn's disease, and scleroderma.        What are the signs or symptoms?    Symptoms of this condition include:  •Difficult or painful swallowing.      •Pain with swallowing acidic liquids, such as citrus juices. You may also have pain when you burp.      •Chest pain and difficulty breathing.      •Nausea and vomiting.      •Pain in the abdomen.      •Weight loss.      •Ulcers in the mouth and white patches in the mouth (candidiasis).      •Fever.      •Coughing up blood or vomiting blood.      •Stool that is black, tarry, or bright red.        How is this diagnosed?    This condition may be diagnosed based on your medical history and a physical exam. You may also have other tests, including:  •A test to examine your esophagus and stomach with a small flexible tube with a camera (endoscopy).      •A test that measures the acidity level in your esophagus.      •A test that measures how much pressure is on your esophagus.      •A barium swallow or modified barium swallow to show the shape, size, and functioning of your esophagus.      •Allergy tests.        How is this treated?    Treatment for this condition depends on the cause of your esophagitis. In some cases, steroids or other medicines may be given to help relieve your symptoms or to treat the underlying cause of your condition. You may have to make some lifestyle changes, such as:  •Avoiding alcohol.      •Quitting any products that contain nicotine or tobacco. These products include cigarettes, chewing tobacco, and vaping devices, such as e-cigarettes. If you need help quitting, ask your health care provider.      •Changing your diet.      •Exercising.      •Changing your sleep habits and your sleep environment.        Follow these instructions at home:    Medicines     •Take over-the-counter and prescription medicines only as told by your health care provider.       • Do not take aspirin, ibuprofen, or other NSAIDs unless your health care provider told you to do so.    •If you have trouble taking pills:  •Use a pill splitter to decrease the size of the pill. This will decrease the chance of the pill getting stuck or injuring your esophagus.       •Drink water after you take a pill.          Eating and drinking      •Avoid foods and drinks that seem to make your symptoms worse.    •Follow a diet as recommended by your health care provider. This may involve avoiding foods and drinks such as:  •Coffee and tea, with or without caffeine.      •Drinks that contain alcohol.      •Energy drinks and sports drinks.      •Carbonated drinks or sodas.      •Chocolate and cocoa.      •Peppermint and mint flavorings.      •Garlic and onions.      •Horseradish.      •Spicy and acidic foods, including peppers, chili powder, hawkins powder, vinegar, hot sauces, and barbecue sauce.      •Citrus fruit juices and citrus fruits, such as oranges, stacie, and limes.      •Tomato-based foods, such as red sauce, chili, salsa, and pizza with red sauce.      •Fried and fatty foods, such as donuts, french fries, potato chips, and high-fat dressings.      •High-fat meats, such as hot dogs and fatty cuts of red and white meats, such as rib eye steak, sausage, ham, and giang.      •High-fat dairy items, such as whole milk, butter, and cream cheese.        Lifestyle     •Eat small, frequent meals instead of large meals.      •Avoid drinking large amounts of liquid with your meals.      •Avoid eating meals during the 2–3 hours before bedtime.      •Avoid lying down right after you eat.      • Do not exercise right after you eat.      • Do not use any products that contain nicotine or tobacco. These products include cigarettes, chewing tobacco, and vaping devices, such as e-cigarettes. If you need help quitting, ask your health care provider.        General instructions      •Pay attention to any changes in your symptoms. Let your health care provider know about them.      •Wear loose-fitting clothing. Do not wear anything tight around your waist that causes pressure on your abdomen.      •Raise (elevate) the head of your bed about 6 inches (15 cm). You may need to use a wedge to do this.      •Try relaxation strategies such as yoga, deep breathing, or meditation to manage stress. If you need help reducing stress, ask your health care provider.      •If you are overweight, reduce your weight to an amount that is healthy for you. Ask your health care provider for guidance about a safe weight loss goal.      •Keep all follow-up visits. This is important.        Contact a health care provider if:    •You have new symptoms.      •You have unexplained weight loss.      •You have difficulty swallowing, or it hurts to swallow.      •You have wheezing or a cough that does not go away.      •Your symptoms do not improve with treatment.      •You have frequent heartburn for more than two weeks.        Get help right away if:    •You have sudden severe pain in your arms, neck, jaw, teeth, or back.      •You suddenly feel sweaty, dizzy, or light-headed.      •You have chest pain or shortness of breath.      •You vomit and the vomit is green, yellow, or black, or it looks like blood or coffee grounds.      •Your stool is red, bloody, or black.      •You have a fever.      •You cannot swallow, drink, or eat.      These symptoms may represent a serious problem that is an emergency. Do not wait to see if the symptoms will go away. Get medical help right away. Call your local emergency services (911 in the [place] Do not drive yourself to the hospital.       Summary    •Esophagitis is inflammation of the esophagus.      •Most causes of esophagitis are not serious.      •Follow your health care provider's instructions about eating and drinking.      •Contact a health care provider if you have new symptoms, have weight loss, or coughing that does not stop.      •Get help right away if you have severe pain in the arms, neck, jaw, teeth, or back, or if you have chest pain, shortness of breath, or fever.      This information is not intended to replace advice given to you by your health care provider. Make sure you discuss any questions you have with your health care provider.      Document Revised: 06/28/2021 Document Reviewed: 06/28/2021    [employer] Patient Education © 2022 [employer]

## 2022-06-27 VITALS
DIASTOLIC BLOOD PRESSURE: 74 MMHG | RESPIRATION RATE: 18 BRPM | HEART RATE: 68 BPM | OXYGEN SATURATION: 99 % | SYSTOLIC BLOOD PRESSURE: 168 MMHG

## 2022-06-27 LAB — TROPONIN I, HIGH SENSITIVITY RESULT: 5.53 NG/L — SIGNIFICANT CHANGE UP

## 2022-06-27 RX ORDER — FAMOTIDINE 10 MG/ML
20 INJECTION INTRAVENOUS ONCE
Refills: 0 | Status: COMPLETED | OUTPATIENT
Start: 2022-06-27 | End: 2022-06-27

## 2022-06-27 RX ORDER — LIDOCAINE 4 G/100G
20 CREAM TOPICAL ONCE
Refills: 0 | Status: COMPLETED | OUTPATIENT
Start: 2022-06-27 | End: 2022-06-27

## 2022-06-27 RX ORDER — ONDANSETRON 8 MG/1
4 TABLET, FILM COATED ORAL ONCE
Refills: 0 | Status: DISCONTINUED | OUTPATIENT
Start: 2022-06-27 | End: 2022-06-27

## 2022-06-27 RX ADMIN — LIDOCAINE 20 MILLILITER(S): 4 CREAM TOPICAL at 00:41

## 2022-06-27 RX ADMIN — FAMOTIDINE 20 MILLIGRAM(S): 10 INJECTION INTRAVENOUS at 02:04

## 2023-04-26 NOTE — OCCUPATIONAL THERAPY INITIAL EVALUATION ADULT - LEVEL OF CONSCIOUSNESS, OT EVAL
alert Topical Clindamycin Counseling: Patient counseled that this medication may cause skin irritation or allergic reactions.  In the event of skin irritation, the patient was advised to reduce the amount of the drug applied or use it less frequently.   The patient verbalized understanding of the proper use and possible adverse effects of clindamycin.  All of the patient's questions and concerns were addressed.
